# Patient Record
Sex: FEMALE | Race: WHITE | NOT HISPANIC OR LATINO | Employment: UNEMPLOYED | URBAN - METROPOLITAN AREA
[De-identification: names, ages, dates, MRNs, and addresses within clinical notes are randomized per-mention and may not be internally consistent; named-entity substitution may affect disease eponyms.]

---

## 2018-12-24 ENCOUNTER — APPOINTMENT (EMERGENCY)
Dept: ULTRASOUND IMAGING | Facility: HOSPITAL | Age: 23
End: 2018-12-24
Payer: COMMERCIAL

## 2018-12-24 ENCOUNTER — HOSPITAL ENCOUNTER (EMERGENCY)
Facility: HOSPITAL | Age: 23
Discharge: HOME | End: 2018-12-24
Attending: EMERGENCY MEDICINE | Admitting: EMERGENCY MEDICINE
Payer: COMMERCIAL

## 2018-12-24 VITALS
HEART RATE: 96 BPM | BODY MASS INDEX: 25.2 KG/M2 | RESPIRATION RATE: 18 BRPM | DIASTOLIC BLOOD PRESSURE: 58 MMHG | TEMPERATURE: 98.3 F | OXYGEN SATURATION: 99 % | SYSTOLIC BLOOD PRESSURE: 124 MMHG | WEIGHT: 180 LBS | HEIGHT: 71 IN

## 2018-12-24 DIAGNOSIS — O26.899 ABDOMINAL PAIN DURING PREGNANCY: Primary | ICD-10-CM

## 2018-12-24 DIAGNOSIS — R10.9 ABDOMINAL PAIN DURING PREGNANCY: Primary | ICD-10-CM

## 2018-12-24 LAB
ALBUMIN SERPL BCP-MCNC: 3.2 G/DL (ref 3.5–5)
ALP SERPL-CCNC: 61 U/L (ref 46–116)
ALT SERPL W P-5'-P-CCNC: 17 U/L (ref 12–78)
ANION GAP SERPL CALCULATED.3IONS-SCNC: 9 MMOL/L (ref 4–13)
AST SERPL W P-5'-P-CCNC: 10 U/L (ref 5–45)
BASOPHILS # BLD AUTO: 0.02 THOUSANDS/ΜL (ref 0–0.1)
BASOPHILS NFR BLD AUTO: 0 % (ref 0–1)
BILIRUB SERPL-MCNC: 0.3 MG/DL (ref 0.2–1)
BILIRUB UR QL STRIP: NEGATIVE
BUN SERPL-MCNC: 6 MG/DL (ref 5–25)
CALCIUM SERPL-MCNC: 9 MG/DL (ref 8.3–10.1)
CHLORIDE SERPL-SCNC: 102 MMOL/L (ref 100–108)
CLARITY UR: CLEAR
CO2 SERPL-SCNC: 26 MMOL/L (ref 21–32)
COLOR UR: YELLOW
CREAT SERPL-MCNC: 0.82 MG/DL (ref 0.6–1.3)
EOSINOPHIL # BLD AUTO: 0.28 THOUSAND/ΜL (ref 0–0.61)
EOSINOPHIL NFR BLD AUTO: 4 % (ref 0–6)
ERYTHROCYTE [DISTWIDTH] IN BLOOD BY AUTOMATED COUNT: 13.2 % (ref 11.6–15.1)
GFR SERPL CREATININE-BSD FRML MDRD: 102 ML/MIN/1.73SQ M
GLUCOSE SERPL-MCNC: 68 MG/DL (ref 65–140)
GLUCOSE UR STRIP-MCNC: NEGATIVE MG/DL
HCT VFR BLD AUTO: 38.2 % (ref 34.8–46.1)
HGB BLD-MCNC: 13 G/DL (ref 11.5–15.4)
HGB UR QL STRIP.AUTO: NEGATIVE
IMM GRANULOCYTES # BLD AUTO: 0.03 THOUSAND/UL (ref 0–0.2)
IMM GRANULOCYTES NFR BLD AUTO: 0 % (ref 0–2)
KETONES UR STRIP-MCNC: ABNORMAL MG/DL
LEUKOCYTE ESTERASE UR QL STRIP: NEGATIVE
LYMPHOCYTES # BLD AUTO: 1.47 THOUSANDS/ΜL (ref 0.6–4.47)
LYMPHOCYTES NFR BLD AUTO: 19 % (ref 14–44)
MCH RBC QN AUTO: 31.1 PG (ref 26.8–34.3)
MCHC RBC AUTO-ENTMCNC: 34 G/DL (ref 31.4–37.4)
MCV RBC AUTO: 91 FL (ref 82–98)
MONOCYTES # BLD AUTO: 0.53 THOUSAND/ΜL (ref 0.17–1.22)
MONOCYTES NFR BLD AUTO: 7 % (ref 4–12)
NEUTROPHILS # BLD AUTO: 5.32 THOUSANDS/ΜL (ref 1.85–7.62)
NEUTS SEG NFR BLD AUTO: 70 % (ref 43–75)
NITRITE UR QL STRIP: NEGATIVE
NRBC BLD AUTO-RTO: 0 /100 WBCS
PH UR STRIP.AUTO: 6.5 [PH] (ref 4.5–8)
PLATELET # BLD AUTO: 187 THOUSANDS/UL (ref 149–390)
PMV BLD AUTO: 9.9 FL (ref 8.9–12.7)
POTASSIUM SERPL-SCNC: 3.7 MMOL/L (ref 3.5–5.3)
PROT SERPL-MCNC: 6.9 G/DL (ref 6.4–8.2)
PROT UR STRIP-MCNC: NEGATIVE MG/DL
RBC # BLD AUTO: 4.18 MILLION/UL (ref 3.81–5.12)
SODIUM SERPL-SCNC: 137 MMOL/L (ref 136–145)
SP GR UR STRIP.AUTO: 1.02 (ref 1–1.03)
UROBILINOGEN UR QL STRIP.AUTO: 1 E.U./DL
WBC # BLD AUTO: 7.65 THOUSAND/UL (ref 4.31–10.16)

## 2018-12-24 PROCEDURE — 36415 COLL VENOUS BLD VENIPUNCTURE: CPT | Performed by: EMERGENCY MEDICINE

## 2018-12-24 PROCEDURE — 87086 URINE CULTURE/COLONY COUNT: CPT | Performed by: EMERGENCY MEDICINE

## 2018-12-24 PROCEDURE — 81003 URINALYSIS AUTO W/O SCOPE: CPT | Performed by: EMERGENCY MEDICINE

## 2018-12-24 PROCEDURE — 96360 HYDRATION IV INFUSION INIT: CPT

## 2018-12-24 PROCEDURE — 99284 EMERGENCY DEPT VISIT MOD MDM: CPT

## 2018-12-24 PROCEDURE — 80053 COMPREHEN METABOLIC PANEL: CPT | Performed by: EMERGENCY MEDICINE

## 2018-12-24 PROCEDURE — 76705 ECHO EXAM OF ABDOMEN: CPT

## 2018-12-24 PROCEDURE — 85025 COMPLETE CBC W/AUTO DIFF WBC: CPT | Performed by: EMERGENCY MEDICINE

## 2018-12-24 RX ORDER — ALBUTEROL SULFATE 90 UG/1
2 AEROSOL, METERED RESPIRATORY (INHALATION) EVERY 6 HOURS PRN
COMMUNITY

## 2018-12-24 RX ORDER — ACETAMINOPHEN 325 MG/1
650 TABLET ORAL ONCE
Status: COMPLETED | OUTPATIENT
Start: 2018-12-24 | End: 2018-12-24

## 2018-12-24 RX ADMIN — SODIUM CHLORIDE 1000 ML: 0.9 INJECTION, SOLUTION INTRAVENOUS at 17:41

## 2018-12-24 RX ADMIN — ACETAMINOPHEN 650 MG: 325 TABLET, FILM COATED ORAL at 17:42

## 2018-12-24 NOTE — DISCHARGE INSTRUCTIONS
Abdominal Pain in Pregnancy   WHAT YOU NEED TO KNOW:   Abdominal pain during pregnancy is common  Some of the causes include heartburn, constipation, gas, false labor, and round ligament pain  Round ligament pain is caused by stretching of the ligaments that support your uterus  Abdominal pain may be caused by a health problem, such as a stomach virus or appendicitis (inflammation of the appendix)  The pain may also be caused by a problem with your pregnancy, such as a threatened miscarriage or  labor  DISCHARGE INSTRUCTIONS:   Follow up with your obstetrician within 3 days:  Write down your questions so you remember to ask them during your visits  Self-care:   · Rest may help to relieve round ligament pain  Ask your healthcare provider about other ways to relieve this pain, such as a supportive belt or pregnancy exercises  · Use a heating pad set to the lowest setting or a hot compress to apply heat to your abdomen  Do this for 20 to 30 minutes every 2 hours for as many days as directed  · Avoid quick changes in position or movements that cause pain  · Do not lie flat in bed or bend over if you have heartburn  Ask your obstetrician if you should make any changes to the foods you eat  Ask if you can take any medicines for heartburn  · Eat more fiber and drink more liquids to relieve constipation  Fiber is found in fruits, vegetables, and whole-grain foods, such as whole-wheat bread and cereals  Ask how much liquid to drink each day and which liquids are best for you  Contact your obstetrician if:  · You continue to have abdominal pain that cannot be relieved  · You have a fever  · You have questions or concerns about your condition or care  Return to the emergency department if:   · You have sudden, severe pain or cramps that are so bad that you cannot walk or talk  · You have a fast heartbeat, shortness of breath, and you feel lightheaded or faint       · You have vaginal bleeding or discharge  · You have nausea, vomiting, fever, and severe pain on your right side  © 2017 [address] [employer] Information is for End User's use only and may not be sold, redistributed or otherwise used for commercial purposes  All illustrations and images included in CareNotes® are the copyrighted property of [employer]  or Adam [employer]  The above information is an  only  It is not intended as medical advice for individual conditions or treatments  Talk to your doctor, nurse or pharmacist before following any medical regimen to see if it is safe and effective for you

## 2018-12-24 NOTE — ED PROVIDER NOTES
History  Chief Complaint   Patient presents with    Abdominal Pain Pregnant     States RLQ aching pain that is very severe today, with intermittent stabbing pain  14 weeks pregnant, David Genao, denies vaginal bleeding      45-year-old female presents for evaluation of abdominal pain  Patient is  P2, approximately told 14 weeks pregnant (14 weeks pregnant by dates, 12 weeks 2 days by ultrasound) presents the emergency department for evaluation of right lower quadrant abdominal pain that started this morning  Patient states that it was a gradual onset, worsening throughout the day, constant pain however worse with movement, sharp pain  Patient states that it feels consistent with prior spontaneous abortions  Patient denies vaginal discharge, no vaginal bleeding, vaginal spotting  She has had an uncomplicated pregnancy otherwise however she does not have OB care at this time because she just moved to this area  She has had ultrasound to confirm IUP  Patient denies trauma to the abdomen  She denies dysuria  She denies nausea and vomiting  She denies diarrhea and constipation  No prior surgical abdominal history  Otherwise healthy 45-year-old female  Prior to Admission Medications   Prescriptions Last Dose Informant Patient Reported? Taking? albuterol (PROVENTIL HFA,VENTOLIN HFA) 90 mcg/act inhaler   Yes Yes   Sig: Inhale 2 puffs every 6 (six) hours as needed for wheezing      Facility-Administered Medications: None       Past Medical History:   Diagnosis Date    Anxiety     Asthma     Cancer ([address] )     skin cancer    Depression     Dissociative identity disorder ([address] )     Intermittent explosive disorder in adult     Migraine     PTSD (post-traumatic stress disorder)        Past Surgical History:   Procedure Laterality Date    ADENOIDECTOMY      CYST REMOVAL      nares    EAR FOREIGN BODY REMOVAL      SKIN CANCER EXCISION      TONSILLECTOMY         History reviewed   No pertinent family history  I have reviewed and agree with the history as documented  Social History   Substance Use Topics    Smoking status: Current Every Day Smoker     Packs/day: 0 50     Types: Cigarettes    Smokeless tobacco: Never Used    Alcohol use No        Review of Systems   Constitutional: Negative for chills, fatigue and fever  HENT: Negative for congestion and sore throat  Respiratory: Negative for apnea, cough, chest tightness and shortness of breath  Cardiovascular: Negative for chest pain and palpitations  Gastrointestinal: Positive for abdominal pain (RLQ - sharp)  Negative for constipation, diarrhea, nausea and vomiting  Genitourinary: Negative for dysuria, pelvic pain, vaginal bleeding (mild spotting), vaginal discharge and vaginal pain  Musculoskeletal: Negative for back pain and neck pain  Skin: Negative for color change and rash  Allergic/Immunologic: Negative for immunocompromised state  Neurological: Negative for dizziness, syncope and headaches  Hematological: Does not bruise/bleed easily  Physical Exam  Physical Exam   Constitutional: She is oriented to person, place, and time  She appears well-developed and well-nourished  No distress  HENT:   Head: Normocephalic and atraumatic  Mouth/Throat: Oropharynx is clear and moist    Eyes: Pupils are equal, round, and reactive to light  Conjunctivae and EOM are normal  Right eye exhibits no discharge  Left eye exhibits no discharge  No scleral icterus  Neck: Normal range of motion  Neck supple  No JVD present  Cardiovascular: Normal rate, regular rhythm, normal heart sounds and intact distal pulses  Exam reveals no gallop and no friction rub  No murmur heard  Pulmonary/Chest: Effort normal and breath sounds normal  No respiratory distress  She has no wheezes  She has no rales  She exhibits no tenderness  Abdominal: Soft  Bowel sounds are normal  She exhibits no distension  There is tenderness (RLQ)   There is guarding  There is no rebound  Gravid - TTP RLQ   Musculoskeletal: Normal range of motion  She exhibits no edema, tenderness or deformity  Neurological: She is alert and oriented to person, place, and time  No cranial nerve deficit  Skin: Skin is warm and dry  No rash noted  She is not diaphoretic  No erythema  No pallor  Psychiatric: She has a normal mood and affect  Her behavior is normal    Vitals reviewed        Vital Signs  ED Triage Vitals [12/24/18 1639]   Temperature Pulse Respirations Blood Pressure SpO2   98 3 °F (36 8 °C) 96 18 124/58 99 %      Temp Source Heart Rate Source Patient Position - Orthostatic VS BP Location FiO2 (%)   Oral Monitor Sitting Left arm --      Pain Score       6           Vitals:    12/24/18 1639   BP: 124/58   Pulse: 96   Patient Position - Orthostatic VS: Sitting       Visual Acuity      ED Medications  Medications   sodium chloride 0 9 % bolus 1,000 mL (0 mL Intravenous Stopped 12/24/18 1841)   acetaminophen (TYLENOL) tablet 650 mg (650 mg Oral Given 12/24/18 1742)       Diagnostic Studies  Results Reviewed     Procedure Component Value Units Date/Time    Urine culture [[id number]] Collected:  12/24/18 1718    Lab Status:  Final result Specimen:  Urine from Urine, Clean Catch Updated:  12/25/18 1812     Urine Culture [id number] cfu/ml     Comprehensive metabolic panel [[id number]]  (Abnormal) Collected:  12/24/18 1740    Lab Status:  Final result Specimen:  Blood from Arm, Right Updated:  12/24/18 1802     Sodium 137 mmol/L      Potassium 3 7 mmol/L      Chloride 102 mmol/L      CO2 26 mmol/L      ANION GAP 9 mmol/L      BUN 6 mg/dL      Creatinine 0 82 mg/dL      Glucose 68 mg/dL      Calcium 9 0 mg/dL      AST 10 U/L      ALT 17 U/L      Alkaline Phosphatase 61 U/L      Total Protein 6 9 g/dL      Albumin 3 2 (L) g/dL      Total Bilirubin 0 30 mg/dL      eGFR 102 ml/min/1 73sq m     Narrative:         National Kidney Disease Education Program recommendations are as follows:  GFR calculation is accurate only with a steady state creatinine  Chronic Kidney disease less than 60 ml/min/1 73 sq  meters  Kidney failure less than 15 ml/min/1 73 sq  meters  CBC and differential [[id number]] Collected:  12/24/18 1740    Lab Status:  Final result Specimen:  Blood from Arm, Right Updated:  12/24/18 1747     WBC 7 65 Thousand/uL      RBC 4 18 Million/uL      Hemoglobin 13 0 g/dL      Hematocrit 38 2 %      MCV 91 fL      MCH 31 1 pg      MCHC 34 0 g/dL      RDW 13 2 %      MPV 9 9 fL      Platelets 397 Thousands/uL      nRBC 0 /100 WBCs      Neutrophils Relative 70 %      Immat GRANS % 0 %      Lymphocytes Relative 19 %      Monocytes Relative 7 %      Eosinophils Relative 4 %      Basophils Relative 0 %      Neutrophils Absolute 5 32 Thousands/µL      Immature Grans Absolute 0 03 Thousand/uL      Lymphocytes Absolute 1 47 Thousands/µL      Monocytes Absolute 0 53 Thousand/µL      Eosinophils Absolute 0 28 Thousand/µL      Basophils Absolute 0 02 Thousands/µL     UA w Reflex to Microscopic w Reflex to Culture [[id number]]  (Abnormal) Collected:  12/24/18 1718    Lab Status:  Final result Specimen:  Urine from Urine, Clean Catch Updated:  12/24/18 1727     Color, UA Yellow     Clarity, UA Clear     Specific [place], UA 1 020     pH, UA 6 5     Leukocytes, UA Negative     Nitrite, UA Negative     Protein, UA Negative mg/dl      Glucose, UA Negative mg/dl      Ketones, UA 15 (1+) (A) mg/dl      Urobilinogen, UA 1 0 E U /dl      Bilirubin, UA Negative     Blood, UA Negative     URINE COMMENT --                 US appendix   ED Interpretation by Judy Anne DO (12/24 1825)   Although appendix is not identified, there are no sonographic findings to suggest acute appendicitis  Final Result by Neetu Tiwari MD (12/24 1815)      Although appendix is not identified, there are no sonographic findings to suggest acute appendicitis              Workstation performed: [id number] Procedures  Procedures       Phone Contacts  ED Phone Contact    ED Course  ED Course as of Dec 26 1146   Mon Dec 24, 2018   1716 Approximately 14 weeks pregnant  States 1st day of last menstrual period was mid September possibly  No OB in the area  MDM  Number of Diagnoses or Management Options  Abdominal pain during pregnancy:   Diagnosis management comments: Abdominal pain in pregnancy  Unable to obtain CT scan secondary to pregnancy  Risks and benefits were discussed with patient and she agrees not have CT scan performed  Bedside ultrasound indicates a 12 week 4 day fetus, reassuring fetal heart rate, 150 beats per minute  Normal fetal movement  Abdominal lab work is performed and within normal limits  Urinalysis does not indicate urinary tract infection or blood in urine  A formal right lower quadrant abdominal ultrasound is performed, no evidence of pathology regarding abdominal pain  Normal appendix, no abnormalities seen with placenta of IUP  Right ovary is normal and with normal blood flow  Likely pain associated with round ligament pain/growing pains of pregnancy  Patient has good return precautions, provide in an OB physician to follow up with, and advised return to the emergency department with worsening condition  Advised APAP for pain in pregnancy         Amount and/or Complexity of Data Reviewed  Clinical lab tests: ordered and reviewed  Tests in the radiology section of CPT®: ordered and reviewed      CritCare Time    Disposition  Final diagnoses:   Abdominal pain during pregnancy     Time reflects when diagnosis was documented in both MDM as applicable and the Disposition within this note     Time User Action Codes Description Comment    12/24/2018  6:27 PM Dariel Samaniego Add [O26 [id number],  R10 9] Abdominal pain during pregnancy       ED Disposition     ED Disposition Condition Comment    Discharge  [facility] discharge to home/self care     Condition at discharge: Good        Follow-up Information     Follow up With Specialties Details Why Contact Info Additional [facility] Emergency Department Emergency Medicine  If symptoms worsen:  Worsening abdominal pain, fever, chills, vaginal discharge, new concerning symptoms  [address]  [phone] [place] [facility], [place], [place], [address] [place] [address] Vicente Woodruff MD Obstetrics and Gynecology, Obstetrics, Gynecology Schedule an appointment as soon as possible for a visit For follow up for [facility] care, and for further testing and treatment for pregnancy 2 [facility] [address] [place] [address]  [phone]             Discharge Medication List as of 12/24/2018  6:28 PM      CONTINUE these medications which have NOT CHANGED    Details   albuterol (PROVENTIL HFA,VENTOLIN HFA) 90 mcg/act inhaler Inhale 2 puffs every 6 (six) hours as needed for wheezing, Historical Med           No discharge procedures on file      ED Provider  Electronically Signed by           Riddhi Quinn DO  12/26/18 8902

## 2018-12-25 LAB — BACTERIA UR CULT: NORMAL

## 2018-12-28 ENCOUNTER — APPOINTMENT (EMERGENCY)
Dept: CT IMAGING | Facility: HOSPITAL | Age: 23
End: 2018-12-28
Payer: COMMERCIAL

## 2018-12-28 ENCOUNTER — HOSPITAL ENCOUNTER (EMERGENCY)
Facility: HOSPITAL | Age: 23
Discharge: HOME | End: 2018-12-28
Attending: EMERGENCY MEDICINE | Admitting: EMERGENCY MEDICINE
Payer: COMMERCIAL

## 2018-12-28 VITALS
DIASTOLIC BLOOD PRESSURE: 50 MMHG | WEIGHT: 179.9 LBS | RESPIRATION RATE: 18 BRPM | SYSTOLIC BLOOD PRESSURE: 89 MMHG | TEMPERATURE: 97.9 F | BODY MASS INDEX: 25.09 KG/M2 | HEART RATE: 78 BPM | OXYGEN SATURATION: 99 %

## 2018-12-28 DIAGNOSIS — R51.9 FACIAL PAIN: ICD-10-CM

## 2018-12-28 DIAGNOSIS — R51.9 HEADACHE: Primary | ICD-10-CM

## 2018-12-28 LAB
ANION GAP SERPL CALCULATED.3IONS-SCNC: 10 MMOL/L (ref 4–13)
APTT PPP: 33 SECONDS (ref 26–38)
B-HCG SERPL-ACNC: ABNORMAL MIU/ML
BASOPHILS # BLD AUTO: 0.02 THOUSANDS/ΜL (ref 0–0.1)
BASOPHILS NFR BLD AUTO: 0 % (ref 0–1)
BUN SERPL-MCNC: 4 MG/DL (ref 5–25)
CALCIUM SERPL-MCNC: 8.9 MG/DL (ref 8.3–10.1)
CHLORIDE SERPL-SCNC: 103 MMOL/L (ref 100–108)
CO2 SERPL-SCNC: 26 MMOL/L (ref 21–32)
CREAT SERPL-MCNC: 0.76 MG/DL (ref 0.6–1.3)
EOSINOPHIL # BLD AUTO: 0.19 THOUSAND/ΜL (ref 0–0.61)
EOSINOPHIL NFR BLD AUTO: 2 % (ref 0–6)
ERYTHROCYTE [DISTWIDTH] IN BLOOD BY AUTOMATED COUNT: 13.3 % (ref 11.6–15.1)
GFR SERPL CREATININE-BSD FRML MDRD: 112 ML/MIN/1.73SQ M
GLUCOSE SERPL-MCNC: 106 MG/DL (ref 65–140)
HCT VFR BLD AUTO: 37.2 % (ref 34.8–46.1)
HGB BLD-MCNC: 12.5 G/DL (ref 11.5–15.4)
IMM GRANULOCYTES # BLD AUTO: 0.04 THOUSAND/UL (ref 0–0.2)
IMM GRANULOCYTES NFR BLD AUTO: 0 % (ref 0–2)
INR PPP: 1.08 (ref 0.86–1.17)
LYMPHOCYTES # BLD AUTO: 2.46 THOUSANDS/ΜL (ref 0.6–4.47)
LYMPHOCYTES NFR BLD AUTO: 23 % (ref 14–44)
MCH RBC QN AUTO: 30.9 PG (ref 26.8–34.3)
MCHC RBC AUTO-ENTMCNC: 33.6 G/DL (ref 31.4–37.4)
MCV RBC AUTO: 92 FL (ref 82–98)
MONOCYTES # BLD AUTO: 0.48 THOUSAND/ΜL (ref 0.17–1.22)
MONOCYTES NFR BLD AUTO: 4 % (ref 4–12)
NEUTROPHILS # BLD AUTO: 7.72 THOUSANDS/ΜL (ref 1.85–7.62)
NEUTS SEG NFR BLD AUTO: 71 % (ref 43–75)
NRBC BLD AUTO-RTO: 0 /100 WBCS
PLATELET # BLD AUTO: 211 THOUSANDS/UL (ref 149–390)
PMV BLD AUTO: 10 FL (ref 8.9–12.7)
POTASSIUM SERPL-SCNC: 3.5 MMOL/L (ref 3.5–5.3)
PROTHROMBIN TIME: 13.9 SECONDS (ref 11.8–14.2)
RBC # BLD AUTO: 4.04 MILLION/UL (ref 3.81–5.12)
SODIUM SERPL-SCNC: 139 MMOL/L (ref 136–145)
WBC # BLD AUTO: 10.91 THOUSAND/UL (ref 4.31–10.16)

## 2018-12-28 PROCEDURE — 80048 BASIC METABOLIC PNL TOTAL CA: CPT | Performed by: PHYSICIAN ASSISTANT

## 2018-12-28 PROCEDURE — 36415 COLL VENOUS BLD VENIPUNCTURE: CPT | Performed by: PHYSICIAN ASSISTANT

## 2018-12-28 PROCEDURE — 84702 CHORIONIC GONADOTROPIN TEST: CPT | Performed by: PHYSICIAN ASSISTANT

## 2018-12-28 PROCEDURE — 96361 HYDRATE IV INFUSION ADD-ON: CPT

## 2018-12-28 PROCEDURE — 99284 EMERGENCY DEPT VISIT MOD MDM: CPT

## 2018-12-28 PROCEDURE — 85730 THROMBOPLASTIN TIME PARTIAL: CPT | Performed by: PHYSICIAN ASSISTANT

## 2018-12-28 PROCEDURE — 85025 COMPLETE CBC W/AUTO DIFF WBC: CPT | Performed by: PHYSICIAN ASSISTANT

## 2018-12-28 PROCEDURE — 96374 THER/PROPH/DIAG INJ IV PUSH: CPT

## 2018-12-28 PROCEDURE — 85610 PROTHROMBIN TIME: CPT | Performed by: PHYSICIAN ASSISTANT

## 2018-12-28 PROCEDURE — 96375 TX/PRO/DX INJ NEW DRUG ADDON: CPT

## 2018-12-28 RX ORDER — DIPHENHYDRAMINE HYDROCHLORIDE 50 MG/ML
25 INJECTION INTRAMUSCULAR; INTRAVENOUS ONCE
Status: COMPLETED | OUTPATIENT
Start: 2018-12-28 | End: 2018-12-28

## 2018-12-28 RX ORDER — OXYMETAZOLINE HYDROCHLORIDE 0.05 G/100ML
2 SPRAY NASAL ONCE
Status: COMPLETED | OUTPATIENT
Start: 2018-12-28 | End: 2018-12-28

## 2018-12-28 RX ORDER — MAGNESIUM SULFATE HEPTAHYDRATE 40 MG/ML
2 INJECTION, SOLUTION INTRAVENOUS ONCE
Status: DISCONTINUED | OUTPATIENT
Start: 2018-12-28 | End: 2018-12-28

## 2018-12-28 RX ORDER — METOCLOPRAMIDE HYDROCHLORIDE 5 MG/ML
10 INJECTION INTRAMUSCULAR; INTRAVENOUS ONCE
Status: COMPLETED | OUTPATIENT
Start: 2018-12-28 | End: 2018-12-28

## 2018-12-28 RX ADMIN — METOCLOPRAMIDE 10 MG: 5 INJECTION, SOLUTION INTRAMUSCULAR; INTRAVENOUS at 05:57

## 2018-12-28 RX ADMIN — OXYMETAZOLINE HYDROCHLORIDE 2 SPRAY: 0.05 SPRAY NASAL at 07:21

## 2018-12-28 RX ADMIN — SODIUM CHLORIDE 1000 ML: 0.9 INJECTION, SOLUTION INTRAVENOUS at 05:50

## 2018-12-28 RX ADMIN — DIPHENHYDRAMINE HYDROCHLORIDE 25 MG: 50 INJECTION INTRAMUSCULAR; INTRAVENOUS at 05:56

## 2018-12-28 NOTE — ED NOTES
Pt resting comfortably, pt needs to wait for fluids to finish then she can be discharged        Ryan Johnson RN  12/28/18 0998

## 2018-12-28 NOTE — DISCHARGE INSTRUCTIONS
Take Afrin as indicated  Follow-up with Neurology  Follow-up with PCP  Follow up with emergency department symptoms persist or exacerbate    Acute Headache   WHAT YOU NEED TO KNOW:   An acute headache is pain or discomfort that starts suddenly and gets worse quickly  You may have an acute headache only when you feel stress or eat certain foods  Other acute headache pain can happen every day, and sometimes several times a day  DISCHARGE INSTRUCTIONS:   Seek care immediately if:   · You have severe pain  · You have numbness or weakness on one side of your face or body  · You have a headache that occurs after a blow to the head, a fall, or other trauma  · You have a headache, are forgetful or confused, or have trouble speaking  · You have a headache, stiff neck, and a fever  Contact your healthcare provider if:   · You have a constant headache and are vomiting  · You have a headache each day that does not get better, even after treatment  · You have changes in your headaches, or new symptoms that occur when you have a headache  · You have questions or concerns about your condition or care  Medicines: You may need any of the following:  · Prescription pain medicine  may be given  The medicine your healthcare provider recommends will depend on the kind of headaches you have  You will need to take prescription headache medicines as directed to prevent a problem called rebound headache  These headaches happen with regular use of pain relievers for headache disorders  · NSAIDs , such as ibuprofen, help decrease swelling, pain, and fever  This medicine is available with or without a doctor's order  NSAIDs can cause stomach bleeding or kidney problems in certain people  If you take blood thinner medicine, always ask your healthcare provider if NSAIDs are safe for you  Always read the medicine label and follow directions  · Acetaminophen  decreases pain and fever   It is available without a doctor's order  Ask how much to take and how often to take it  Follow directions  Read the labels of all other medicines you are using to see if they also contain acetaminophen, or ask your doctor or pharmacist  Acetaminophen can cause liver damage if not taken correctly  Do not use more than 3 grams (3,000 milligrams) total of acetaminophen in one day  · Antidepressants  may be given for some kinds of headaches  · Take your medicine as directed  Contact your healthcare provider if you think your medicine is not helping or if you have side effects  Tell him or her if you are allergic to any medicine  Keep a list of the medicines, vitamins, and herbs you take  Include the amounts, and when and why you take them  Bring the list or the pill bottles to follow-up visits  Carry your medicine list with you in case of an emergency  Manage your symptoms:   · Apply heat or ice  on the headache area  Use a heat or ice pack  For an ice pack, you can also put crushed ice in a plastic bag  Cover the pack or bag with a towel before you apply it to your skin  Ice and heat both help decrease pain, and heat also helps decrease muscle spasms  Apply heat for 20 to 30 minutes every 2 hours  Apply ice for 15 to 20 minutes every hour  Apply heat or ice for as long and for as many days as directed  You may alternate heat and ice  · Relax your muscles  Lie down in a comfortable position and close your eyes  Relax your muscles slowly  Start at your toes and work your way up your body  · Keep a record of your headaches  Write down when your headaches start and stop  Include your symptoms and what you were doing when the headache began  Record what you ate or drank for 24 hours before the headache started  Describe the pain and where it hurts  Keep track of what you did to treat your headache and if it worked  Prevent an acute headache:   · Avoid anything that triggers an acute headache    Examples include exposure to chemicals, going to high altitude, or not getting enough sleep  Create a regular sleep routine  Go to sleep at the same time and wake up at the same time each day  Do not use electronic devices before bedtime  These may trigger a headache or prevent you from sleeping well  · Do not smoke  Nicotine and other chemicals in cigarettes and cigars can trigger an acute headache or make it worse  Ask your healthcare provider for information if you currently smoke and need help to quit  E-cigarettes or smokeless tobacco still contain nicotine  Talk to your healthcare provider before you use these products  · Limit alcohol as directed  Alcohol can trigger an acute headache or make it worse  If you have cluster headaches, do not drink alcohol during an episode  For other types of headaches, ask your healthcare provider if it is safe for you to drink alcohol  Ask how much is safe for you to drink, and how often  · Exercise as directed  Exercise can reduce tension and help with headache pain  Aim for 30 minutes of physical activity on most days of the week  Your healthcare provider can help you create an exercise plan  · Eat a variety of healthy foods  Healthy foods include fruits, vegetables, low-fat dairy products, lean meats, fish, whole grains, and cooked beans  Your healthcare provider or dietitian can help you create meals plans if you need to avoid foods that trigger headaches  Follow up with your healthcare provider as directed:  Bring your headache record with you when you see your healthcare provider  Write down your questions so you remember to ask them during your visits  © 2017 [address] Information is for End User's use only and may not be sold, redistributed or otherwise used for commercial purposes  All illustrations and images included in CareNotes® are the copyrighted property of [employer]  or [employer]  The above information is an  only   It is not intended as medical advice for individual conditions or treatments  Talk to your doctor, nurse or pharmacist before following any medical regimen to see if it is safe and effective for you  Acute Headache   WHAT YOU NEED TO KNOW:   An acute headache is pain or discomfort that starts suddenly and gets worse quickly  You may have an acute headache only when you feel stress or eat certain foods  Other acute headache pain can happen every day, and sometimes several times a day  DISCHARGE INSTRUCTIONS:   Return to the emergency department if:   · You have severe pain  · You have numbness or weakness on one side of your face or body  · You have a headache that occurs after a blow to the head, a fall, or other trauma  · You have a headache, are forgetful or confused, or have trouble speaking  · You have a headache, stiff neck, and a fever  Contact your healthcare provider if:   · You have a constant headache and are vomiting  · You have a headache each day that does not get better, even after treatment  · You have changes in your headaches, or new symptoms that occur when you have a headache  · You have questions or concerns about your condition or care  Medicines: You may need any of the following:  · Prescription pain medicine  may be given  The medicine your healthcare provider recommends will depend on the kind of headaches you have  You will need to take prescription headache medicines as directed to prevent a problem called rebound headache  These headaches happen with regular use of pain relievers for headache disorders  · NSAIDs , such as ibuprofen, help decrease swelling, pain, and fever  This medicine is available with or without a doctor's order  NSAIDs can cause stomach bleeding or kidney problems in certain people  If you take blood thinner medicine, always ask your healthcare provider if NSAIDs are safe for you   Always read the medicine label and follow directions  · Acetaminophen  decreases pain and fever  It is available without a doctor's order  Ask how much to take and how often to take it  Follow directions  Read the labels of all other medicines you are using to see if they also contain acetaminophen, or ask your doctor or pharmacist  Acetaminophen can cause liver damage if not taken correctly  Do not use more than 3 grams (3,000 milligrams) total of acetaminophen in one day  · Antidepressants  may be given for some kinds of headaches  · Take your medicine as directed  Contact your healthcare provider if you think your medicine is not helping or if you have side effects  Tell him or her if you are allergic to any medicine  Keep a list of the medicines, vitamins, and herbs you take  Include the amounts, and when and why you take them  Bring the list or the pill bottles to follow-up visits  Carry your medicine list with you in case of an emergency  Manage your symptoms:   · Apply heat or ice  on the headache area  Use a heat or ice pack  For an ice pack, you can also put crushed ice in a plastic bag  Cover the pack or bag with a towel before you apply it to your skin  Ice and heat both help decrease pain, and heat also helps decrease muscle spasms  Apply heat for 20 to 30 minutes every 2 hours  Apply ice for 15 to 20 minutes every hour  Apply heat or ice for as long and for as many days as directed  You may alternate heat and ice  · Relax your muscles  Lie down in a comfortable position and close your eyes  Relax your muscles slowly  Start at your toes and work your way up your body  · Keep a record of your headaches  Write down when your headaches start and stop  Include your symptoms and what you were doing when the headache began  Record what you ate or drank for 24 hours before the headache started  Describe the pain and where it hurts  Keep track of what you did to treat your headache and if it worked    Prevent an acute headache: · Avoid anything that triggers an acute headache  Examples include exposure to chemicals, going to high altitude, or not getting enough sleep  Create a regular sleep routine  Go to sleep at the same time and wake up at the same time each day  Do not use electronic devices before bedtime  These may trigger a headache or prevent you from sleeping well  · Do not smoke  Nicotine and other chemicals in cigarettes and cigars can trigger an acute headache or make it worse  Ask your healthcare provider for information if you currently smoke and need help to quit  E-cigarettes or smokeless tobacco still contain nicotine  Talk to your healthcare provider before you use these products  · Limit alcohol as directed  Alcohol can trigger an acute headache or make it worse  If you have cluster headaches, do not drink alcohol during an episode  For other types of headaches, ask your healthcare provider if it is safe for you to drink alcohol  Ask how much is safe for you to drink, and how often  · Exercise as directed  Exercise can reduce tension and help with headache pain  Aim for 30 minutes of physical activity on most days of the week  Your healthcare provider can help you create an exercise plan  · Eat a variety of healthy foods  Healthy foods include fruits, vegetables, low-fat dairy products, lean meats, fish, whole grains, and cooked beans  Your healthcare provider or dietitian can help you create meals plans if you need to avoid foods that trigger headaches  Follow up with your healthcare provider as directed:  Bring your headache record with you when you see your healthcare provider  Write down your questions so you remember to ask them during your visits  © 2017 [address] [employer] Information is for End User's use only and may not be sold, redistributed or otherwise used for commercial purposes   All illustrations and images included in CareNotes® are the copyrighted property of [employer]  or [employer] Rosa  The above information is an  only  It is not intended as medical advice for individual conditions or treatments  Talk to your doctor, nurse or pharmacist before following any medical regimen to see if it is safe and effective for you  Oxymetazoline (Into the nose)   Oxymetazoline ([id number])  Helps relieve a stuffy nose  Brand Name(s): 12-Hour Nasal, 4-Way Long Lasting, Afrin, Afrin Extra Moisturizing, Afrin No-Drip Sinus, Afrin PumpMist, Afrin Sinus, Afrin w/Menthol, Dristan 12-Hr, Duramist Plus, [facility] Nasal [place] Pump, [facility] Nasal [place], Good Sense Nasal Spray, Mucinex Full Force, Mucinex Moisture Smart   There may be other brand names for this medicine  When This Medicine Should Not Be Used: This medicine is not right for everyone  Do not use it if you had an allergic reaction to oxymetazoline  How to Use This Medicine:   Spray  · Your doctor will tell you how much medicine to use  Do not use more than directed  · Follow the instructions on the medicine label if you are using this medicine without a prescription  · If you are using the nasal spray for the first time, you will need to prime the spray  To do this, pump or squeeze the bottle until some of the medicine sprays out  Now it is ready to use  Prime the spray after each time you clean the pump, or if you have not used the medicine for 5 days or longer  · Shake the nasal spray well before each use  · Before using the medicine, gently blow your nose to clear the nostrils  · Keep your head upright while spraying the medicine into each nostril  Repeat until you have used 2 to 3 sprays in each nostril  · Do not use more than two times in 24 hours, unless your doctor tells you to  · Do not share this medicine with other people  · After using the nasal spray, wipe the tip of the bottle with a clean tissue and put the cap back on   · Missed dose:  Take a dose as soon as you remember  If it is almost time for your next dose, wait until then and take a regular dose  Do not take extra medicine to make up for a missed dose  · Keep the bottle tightly closed when not using it  Store at room temperature, away from heat and direct light  Drugs and Foods to Avoid:      Ask your doctor or pharmacist before using any other medicine, including over-the-counter medicines, vitamins, and herbal products  Warnings While Using This Medicine:   · Tell your doctor if you are pregnant or breastfeeding, or if you have heart disease, high blood pressure, diabetes, thyroid problems, or an enlarged prostate  · Do not use this medicine for more than 3 days unless your doctor tell you to  · Call your doctor if your symptoms do not improve or if they get worse  · Keep all medicine out of the reach of children  Never share your medicine with anyone  Possible Side Effects While Using This Medicine:   Call your doctor right away if you notice any of these side effects:  · Allergic reaction: Itching or hives, swelling in your face or hands, swelling or tingling in your mouth or throat, chest tightness, trouble breathing  If you notice these less serious side effects, talk with your doctor:   · Burning or stinging inside of your nose  If you notice other side effects that you think are caused by this medicine, tell your doctor  Call your doctor for medical advice about side effects  You may report side effects to FDA at 9-769-FDA-1317  © 2017 [address] Information is for End User's use only and may not be sold, redistributed or otherwise used for commercial purposes  The above information is an  only  It is not intended as medical advice for individual conditions or treatments  Talk to your doctor, nurse or pharmacist before following any medical regimen to see if it is safe and effective for you        Oxymetazoline (Into the nose)   Oxymetazoline ([id number])  Helps relieve a stuffy nose  Brand Name(s): 12-Hour Nasal, 4-Way Long Lasting, Afrin, Afrin Extra Moisturizing, Afrin No-Drip Sinus, Afrin PumpMist, Afrin Sinus, Afrin w/Menthol, Dristan 12-Hr, Duramist Plus, [facility] Nasal [place] Pump, [facility] Nasal [place], Good Sense Nasal Spray, Mucinex Full Force, Mucinex Moisture Smart   There may be other brand names for this medicine  When This Medicine Should Not Be Used: This medicine is not right for everyone  Do not use it if you had an allergic reaction to oxymetazoline  How to Use This Medicine:   Spray  · Your doctor will tell you how much medicine to use  Do not use more than directed  · Follow the instructions on the medicine label if you are using this medicine without a prescription  · If you are using the nasal spray for the first time, you will need to prime the spray  To do this, pump or squeeze the bottle until some of the medicine sprays out  Now it is ready to use  Prime the spray after each time you clean the pump, or if you have not used the medicine for 5 days or longer  · Shake the nasal spray well before each use  · Before using the medicine, gently blow your nose to clear the nostrils  · Keep your head upright while spraying the medicine into each nostril  Repeat until you have used 2 to 3 sprays in each nostril  · Do not use more than two times in 24 hours, unless your doctor tells you to  · Do not share this medicine with other people  · After using the nasal spray, wipe the tip of the bottle with a clean tissue and put the cap back on   · Missed dose: Take a dose as soon as you remember  If it is almost time for your next dose, wait until then and take a regular dose  Do not take extra medicine to make up for a missed dose  · Keep the bottle tightly closed when not using it  Store at room temperature, away from heat and direct light    Drugs and Foods to Avoid:      Ask your doctor or pharmacist before using any other medicine, including over-the-counter medicines, vitamins, and herbal products  Warnings While Using This Medicine:   · Tell your doctor if you are pregnant or breastfeeding, or if you have heart disease, high blood pressure, diabetes, thyroid problems, or an enlarged prostate  · Do not use this medicine for more than 3 days unless your doctor tell you to  · Call your doctor if your symptoms do not improve or if they get worse  · Keep all medicine out of the reach of children  Never share your medicine with anyone  Possible Side Effects While Using This Medicine:   Call your doctor right away if you notice any of these side effects:  · Allergic reaction: Itching or hives, swelling in your face or hands, swelling or tingling in your mouth or throat, chest tightness, trouble breathing  If you notice these less serious side effects, talk with your doctor:   · Burning or stinging inside of your nose  If you notice other side effects that you think are caused by this medicine, tell your doctor  Call your doctor for medical advice about side effects  You may report side effects to FDA at 3-019-FDA-2177  © 2017 [address] [employer] Information is for End User's use only and may not be sold, redistributed or otherwise used for commercial purposes  The above information is an  only  It is not intended as medical advice for individual conditions or treatments  Talk to your doctor, nurse or pharmacist before following any medical regimen to see if it is safe and effective for you

## 2018-12-28 NOTE — ED PROVIDER NOTES
History  Chief Complaint   Patient presents with    Migraine     Pt presents to ED with severe migraine  Pt states she has chronic migraines but this is "much worse"     Patient is a 21-year-old female with history of asthma, migraine, anxiety that presents emergency department with severe searing nonradiating headache for 2 days  Patient states that her present headache began 2 days ago and was similar to migraines she had in the past   Patient stated that her headache gradually increased in severity still a walker out of a sleep this evening, and she characterizes her present headache as the worst in her life  Patient also has associated symptoms of frontal and maxillary bilateral congestion, and intermittent hacking productive cough with yellow sputum for 2 days  Patient also admits that she is 14 weeks pregnant  Patient's significant other is at bedside at the time of presentation  Patient also verbalizes that she could not take Motrin for headache symptom abatement due to her current pregnancy, and that this was the 1st headache she has had during pregnancy  Patient affirms provocative factors of lights and sounds, and denies palliative factors  Patient denies ineffectual treatment  Patient denies fever, chills, diarrhea, constipation, and urinary symptoms  Patient affirms blurry vision photophobia and audiophobia  Patient denies meningeal and vertiginous symptomatology  Patient denies tinnitus  Patient denies recent fall  Patient affirms having been hit in the frontal aspect of her skull by a "slow opening car door four days ago", and had not reported any loss of consciousness, vomiting, blurred vision, or tinnitus status post incident and to current presentation  Patient denies numbness, tingling, and loss of power  Patient denies family history of stroke and cerebral aneurysm  Patient denies chest pain, shortness of breath, and abdominal pain  Patient is not in acute distress      Patient requested lytes to be turned off in ED 29 during physical exam   Patient had been closing her eyes lying on her left side and rocking back and forth on stretcher  Patient has benign neurological exam with photophobia identified with PERRL b/l  Patient had ambulated into the emergency department without difficulty without assistance of a cane or ambulatory device  Head CT scan had been offered to patient, and patient declined imaging procedure after risks of radiation to her unborn fetus had been presented by [facility] and CT tech both verbally and for the use of risk of radiation foreign issue by head CT scan prior to head CT study  Patient was given 20 min to make a decision to have head CT performed, and patient verbally declined having had head CT scan performed  Will deliver Reglan, Benadryl, and normal saline solution for patient headache  Will also deliver Afrin for bilateral sinus congestion  Will continue to reassess patient headache symptomatology frequently throughout ED visit  Will also attain clinical blood labs  Differential Diagnosis consists of but not limited to:  Epidural hematoma  Subdural hematoma  Subarachnoid hemorrhage  Concussion  Migraine headache  Tension headache  Sinusitis         History provided by:  Patient and friend   used: No        Prior to Admission Medications   Prescriptions Last Dose Informant Patient Reported? Taking?    albuterol (PROVENTIL HFA,VENTOLIN HFA) 90 mcg/act inhaler   Yes No   Sig: Inhale 2 puffs every 6 (six) hours as needed for wheezing      Facility-Administered Medications: None       Past Medical History:   Diagnosis Date    Anxiety     Asthma     Cancer ([facility] [address] )     skin cancer    Depression     Dissociative identity disorder ([facility] [address] )     Intermittent explosive disorder in adult     Migraine     PTSD (post-traumatic stress disorder)        Past Surgical History:   Procedure Laterality Date    ADENOIDECTOMY      CYST REMOVAL nares    EAR FOREIGN BODY REMOVAL      SKIN CANCER EXCISION      TONSILLECTOMY         History reviewed  No pertinent family history  I have reviewed and agree with the history as documented  Social History   Substance Use Topics    Smoking status: Current Every Day Smoker     Packs/day: 0 50     Types: Cigarettes    Smokeless tobacco: Never Used    Alcohol use No        Review of Systems   Constitutional: Negative for activity change, appetite change, chills and fever  HENT: Positive for congestion, sinus pain and sinus pressure  Eyes: Positive for photophobia and visual disturbance  Negative for pain and redness  Respiratory: Negative for cough, chest tightness, shortness of breath, wheezing and stridor  Cardiovascular: Negative for chest pain and palpitations  Gastrointestinal: Negative for abdominal pain, constipation, diarrhea, nausea and vomiting  Genitourinary: Negative for difficulty urinating, dysuria and frequency  Musculoskeletal: Negative for back pain and gait problem  Skin: Negative for pallor and rash  Allergic/Immunologic: Negative for environmental allergies and food allergies  Neurological: Negative for dizziness, speech difficulty, weakness, light-headedness, numbness and headaches  Psychiatric/Behavioral: Negative for confusion  All other systems reviewed and are negative  Physical Exam  Physical Exam   Constitutional: She is oriented to person, place, and time  She appears well-developed and well-nourished  She is cooperative  HENT:   Head: Normocephalic and atraumatic  Right Ear: Hearing, tympanic membrane, external ear and ear canal normal  No drainage, swelling or tenderness  No mastoid tenderness  No decreased hearing is noted  Left Ear: Hearing, tympanic membrane, external ear and ear canal normal  No drainage, swelling or tenderness  No mastoid tenderness  No decreased hearing is noted  Nose: Mucosal edema present   Right sinus exhibits maxillary sinus tenderness and frontal sinus tenderness  Left sinus exhibits maxillary sinus tenderness and frontal sinus tenderness  Mouth/Throat: Uvula is midline, oropharynx is clear and moist and mucous membranes are normal  No oropharyngeal exudate, posterior oropharyngeal edema or posterior oropharyngeal erythema  No tonsillar exudate  Eyes: Pupils are equal, round, and reactive to light  Conjunctivae, EOM and lids are normal  Right eye exhibits no discharge  Left eye exhibits no discharge  Neck: Trachea normal, normal range of motion, full passive range of motion without pain and phonation normal  Neck supple  No JVD present  No tracheal tenderness, no spinous process tenderness and no muscular tenderness present  No neck rigidity  No tracheal deviation and normal range of motion present  Cardiovascular: Normal rate, regular rhythm, normal heart sounds, intact distal pulses and normal pulses  Pulses:       Carotid pulses are 2+ on the right side, and 2+ on the left side  Radial pulses are 2+ on the right side, and 2+ on the left side  Dorsalis pedis pulses are 2+ on the right side, and 2+ on the left side  Posterior tibial pulses are 2+ on the right side, and 2+ on the left side  Pulmonary/Chest: Effort normal and breath sounds normal  No stridor  She has no decreased breath sounds  She has no wheezes  She has no rhonchi  She has no rales  She exhibits no tenderness and no crepitus  Abdominal: Soft  Normal appearance and bowel sounds are normal  She exhibits no distension  There is no tenderness  There is no rigidity, no rebound, no guarding and no CVA tenderness  Musculoskeletal: Normal range of motion     Passive ROM intact  Upper and lower extremity 5/5 bilaterally  Neurovascularly intact  No grinding or clicking of joints     Lymphadenopathy:        Head (right side): No submental, no submandibular, no tonsillar, no preauricular, no posterior auricular and no occipital adenopathy present  Head (left side): No submental, no submandibular, no tonsillar, no preauricular, no posterior auricular and no occipital adenopathy present  She has no cervical adenopathy  Right cervical: No superficial cervical, no deep cervical and no posterior cervical adenopathy present  Left cervical: No superficial cervical, no deep cervical and no posterior cervical adenopathy present  Neurological: She is alert and oriented to person, place, and time  She has normal strength and normal reflexes  She displays no tremor  No cranial nerve deficit (cn 2-12 intact) or sensory deficit  She displays a negative Romberg sign  Coordination and gait normal  GCS eye subscore is 4  GCS verbal subscore is 5  GCS motor subscore is 6  Reflex Scores:       Patellar reflexes are 2+ on the right side and 2+ on the left side  Skin: Skin is warm and intact  Capillary refill takes less than 2 seconds  She is not diaphoretic  Psychiatric: She has a normal mood and affect  Her speech is normal and behavior is normal  Judgment and thought content normal  Cognition and memory are normal    Nursing note and vitals reviewed        Vital Signs  ED Triage Vitals   Temperature Pulse Respirations Blood Pressure SpO2   12/28/18 0437 12/28/18 0437 12/28/18 0437 12/28/18 0437 12/28/18 0437   97 9 °F (36 6 °C) 93 18 119/77 98 %      Temp Source Heart Rate Source Patient Position - Orthostatic VS BP Location FiO2 (%)   12/28/18 0437 12/28/18 0437 12/28/18 0437 12/28/18 0437 --   Oral Monitor Sitting Right arm       Pain Score       12/28/18 0500       Worst Possible Pain           Vitals:    12/28/18 0437 12/28/18 0720   BP: 119/77 (!) 89/50   Pulse: 93 78   Patient Position - Orthostatic VS: Sitting Lying       Visual Acuity      ED Medications  Medications   metoclopramide (REGLAN) injection 10 mg (10 mg Intravenous Given 12/28/18 0557)   diphenhydrAMINE (BENADRYL) injection 25 mg (25 mg Intravenous Given 12/28/18 0556)   sodium chloride 0 9 % bolus 1,000 mL (0 mL Intravenous Stopped 12/28/18 0718)   oxymetazoline (AFRIN) 0 05 % nasal spray 2 spray (2 sprays Each Nare Given 12/28/18 0721)       Diagnostic Studies  Results Reviewed     Procedure Component Value Units Date/Time    Basic metabolic panel [[id number]]  (Abnormal) Collected:  12/28/18 0554    Lab Status:  Final result Specimen:  Blood from Arm, Right Updated:  12/28/18 0703     Sodium 139 mmol/L      Potassium 3 5 mmol/L      Chloride 103 mmol/L      CO2 26 mmol/L      ANION GAP 10 mmol/L      BUN 4 (L) mg/dL      Creatinine 0 76 mg/dL      Glucose 106 mg/dL      Calcium 8 9 mg/dL      eGFR 112 ml/min/1 73sq m     Narrative:         National Kidney Disease Education Program recommendations are as follows:  GFR calculation is accurate only with a steady state creatinine  Chronic Kidney disease less than 60 ml/min/1 73 sq  meters  Kidney failure less than 15 ml/min/1 73 sq  meters      Quantitative hCG [[id number]]  (Abnormal) Collected:  12/28/18 0554    Lab Status:  Final result Specimen:  Blood from Arm, Right Updated:  12/28/18 0702     HCG, Quant 44,921 (H) mIU/mL     Narrative:          Expected Ranges:     Approximate               Approximate HCG  Gestation age          Concentration ( mIU/mL)  _____________          ______________________   [facility]                      HCG values  0 2-1                       5-50  1-2                           2-3                         100-5000  3-4                         500-62510  4-5                         1000-64233  5-6                         [id number]  6-8                         [id number]  8-12                        [id number]    Protime-INR [[id number]]  (Normal) Collected:  12/28/18 0554    Lab Status:  Final result Specimen:  Blood from Arm, Right Updated:  12/28/18 0617     Protime 13 9 seconds      INR 1 08    APTT [[id number]]  (Normal) Collected:  12/28/18 0554    Lab Status:  Final result Specimen:  Blood from Arm, Right Updated:  12/28/18 0617     PTT 33 seconds     CBC and differential [[id number]]  (Abnormal) Collected:  12/28/18 0554    Lab Status:  Final result Specimen:  Blood from Arm, Right Updated:  12/28/18 0603     WBC 10 91 (H) Thousand/uL      RBC 4 04 Million/uL      Hemoglobin 12 5 g/dL      Hematocrit 37 2 %      MCV 92 fL      MCH 30 9 pg      MCHC 33 6 g/dL      RDW 13 3 %      MPV 10 0 fL      Platelets 559 Thousands/uL      nRBC 0 /100 WBCs      Neutrophils Relative 71 %      Immat GRANS % 0 %      Lymphocytes Relative 23 %      Monocytes Relative 4 %      Eosinophils Relative 2 %      Basophils Relative 0 %      Neutrophils Absolute 7 72 (H) Thousands/µL      Immature Grans Absolute 0 04 Thousand/uL      Lymphocytes Absolute 2 46 Thousands/µL      Monocytes Absolute 0 48 Thousand/µL      Eosinophils Absolute 0 19 Thousand/µL      Basophils Absolute 0 02 Thousands/µL                  No orders to display              Procedures  Procedures       Phone Contacts  ED Phone Contact    ED Course  ED Course as of Dec 29 1519   Fri Dec 28, 2018   0626 Patient denies head CT without contrast; radiation risks discussed with patient and patient was given ample time to consider risks and benefits  Patient denies head CT without contrast    0711 Patient verbalizing feeling better status post medication delivery; patient verbalize request to go home  Will have patient follow up with Neurology for migraine control                                MDM  Number of Diagnoses or Management Options  Facial pain: minor  Headache: minor  Diagnosis management comments: Patient denied having CT of head without contrast after being presented with possible radiation pregnancy complications both verbal and nonverbal; Patient was given 20 min to make a decision to have head CT performed, and patient verbally declined having had CT scan performed    Positive qualitative HCG  Negative clinical blood labs  Patient verbalizes improvement of headache symptoms to 2/10 from 10/10 pain status post medication delivery  Patient is smiling and opening eyes with no squinting status post medication delivery  Patient verbally requests to be discharged to home care  INO counseled patient to follow up with Neurology for migraine control through pregnancy  Counseled patient on avoiding migraine triggers  Follow-up with Neurology  Follow-up with PCP  Follow up with emergency department if symptoms persist or exacerbate  Patient demonstrates verbal understanding of all clinical lab data, discharge instructions, and follow-up  Amount and/or Complexity of Data Reviewed  Clinical lab tests: ordered and reviewed  Review and summarize past medical records: yes  Discuss the patient with other providers: yes (Dr Esthela Paula)    Risk of Complications, Morbidity, and/or Mortality  Presenting problems: moderate    Patient Progress  Patient progress: stable    CritCare Time    Disposition  Final diagnoses:   Headache - secondary to bilateral frontal and maxillary pain   Facial pain     Time reflects when diagnosis was documented in both MDM as applicable and the Disposition within this note     Time User Action Codes Description Comment    12/28/2018  7:12 AM Emerald  Add [R51] Headache     12/28/2018  7:12 AM Emerald  Add [R51] Facial pain     12/28/2018  7:14 AM Emerald  Modify [R51] Headache secondary to bilateral frontal and maxillary pain      ED Disposition     ED Disposition Condition Comment    Discharge  [facility] discharge to home/self care      Condition at discharge: Stable        Follow-up Information     Follow up With Specialties Details Why Contact Info Additional Information    Neurology [facility] Neurology Call in 2 days for further evaluation of symptoms [address] [facility] Neurology [facility], [address] [address], [place] Call in 2 days for further evaluation of symptoms [address] [facility] [address] [phone] [facility], [address], [place], [address] Emergency Department Emergency Medicine Go to As needed [address] [facility] [address] [phone] [place] [facility], [address], [place], [address]          Discharge Medication List as of 12/28/2018  7:16 AM      CONTINUE these medications which have NOT CHANGED    Details   albuterol (PROVENTIL HFA,VENTOLIN HFA) 90 mcg/act inhaler Inhale 2 puffs every 6 (six) hours as needed for wheezing, Historical Med           No discharge procedures on file      ED Provider  Electronically Signed by           Do Hawk PA-C  12/29/18 5339

## 2019-01-04 ENCOUNTER — HOSPITAL ENCOUNTER (EMERGENCY)
Facility: HOSPITAL | Age: 24
Discharge: HOME | End: 2019-01-04
Attending: EMERGENCY MEDICINE
Payer: COMMERCIAL

## 2019-01-04 VITALS
SYSTOLIC BLOOD PRESSURE: 101 MMHG | WEIGHT: 179.9 LBS | RESPIRATION RATE: 16 BRPM | TEMPERATURE: 97.6 F | BODY MASS INDEX: 25.09 KG/M2 | DIASTOLIC BLOOD PRESSURE: 59 MMHG | HEART RATE: 75 BPM | OXYGEN SATURATION: 100 %

## 2019-01-04 DIAGNOSIS — O21.9 NAUSEA AND VOMITING IN PREGNANCY: Primary | ICD-10-CM

## 2019-01-04 DIAGNOSIS — O26.899 ABDOMINAL PAIN AFFECTING PREGNANCY: ICD-10-CM

## 2019-01-04 DIAGNOSIS — R10.9 ABDOMINAL PAIN AFFECTING PREGNANCY: ICD-10-CM

## 2019-01-04 LAB
ALBUMIN SERPL BCP-MCNC: 3.3 G/DL (ref 3.5–5)
ALP SERPL-CCNC: 76 U/L (ref 46–116)
ALT SERPL W P-5'-P-CCNC: 17 U/L (ref 12–78)
ANION GAP SERPL CALCULATED.3IONS-SCNC: 11 MMOL/L (ref 4–13)
AST SERPL W P-5'-P-CCNC: 12 U/L (ref 5–45)
BASOPHILS # BLD AUTO: 0.02 THOUSANDS/ΜL (ref 0–0.1)
BASOPHILS NFR BLD AUTO: 0 % (ref 0–1)
BILIRUB SERPL-MCNC: 0.3 MG/DL (ref 0.2–1)
BUN SERPL-MCNC: 5 MG/DL (ref 5–25)
CALCIUM SERPL-MCNC: 9.2 MG/DL (ref 8.3–10.1)
CHLORIDE SERPL-SCNC: 103 MMOL/L (ref 100–108)
CO2 SERPL-SCNC: 24 MMOL/L (ref 21–32)
CREAT SERPL-MCNC: 0.84 MG/DL (ref 0.6–1.3)
EOSINOPHIL # BLD AUTO: 0.23 THOUSAND/ΜL (ref 0–0.61)
EOSINOPHIL NFR BLD AUTO: 2 % (ref 0–6)
ERYTHROCYTE [DISTWIDTH] IN BLOOD BY AUTOMATED COUNT: 13.3 % (ref 11.6–15.1)
GFR SERPL CREATININE-BSD FRML MDRD: 99 ML/MIN/1.73SQ M
GLUCOSE SERPL-MCNC: 78 MG/DL (ref 65–140)
HCT VFR BLD AUTO: 42.9 % (ref 34.8–46.1)
HGB BLD-MCNC: 14.6 G/DL (ref 11.5–15.4)
IMM GRANULOCYTES # BLD AUTO: 0.06 THOUSAND/UL (ref 0–0.2)
IMM GRANULOCYTES NFR BLD AUTO: 1 % (ref 0–2)
LYMPHOCYTES # BLD AUTO: 2.03 THOUSANDS/ΜL (ref 0.6–4.47)
LYMPHOCYTES NFR BLD AUTO: 16 % (ref 14–44)
MCH RBC QN AUTO: 31.1 PG (ref 26.8–34.3)
MCHC RBC AUTO-ENTMCNC: 34 G/DL (ref 31.4–37.4)
MCV RBC AUTO: 92 FL (ref 82–98)
MONOCYTES # BLD AUTO: 0.63 THOUSAND/ΜL (ref 0.17–1.22)
MONOCYTES NFR BLD AUTO: 5 % (ref 4–12)
NEUTROPHILS # BLD AUTO: 9.52 THOUSANDS/ΜL (ref 1.85–7.62)
NEUTS SEG NFR BLD AUTO: 76 % (ref 43–75)
NRBC BLD AUTO-RTO: 0 /100 WBCS
PLATELET # BLD AUTO: 260 THOUSANDS/UL (ref 149–390)
PMV BLD AUTO: 9.6 FL (ref 8.9–12.7)
POTASSIUM SERPL-SCNC: 4.2 MMOL/L (ref 3.5–5.3)
PROT SERPL-MCNC: 7.7 G/DL (ref 6.4–8.2)
RBC # BLD AUTO: 4.69 MILLION/UL (ref 3.81–5.12)
SODIUM SERPL-SCNC: 138 MMOL/L (ref 136–145)
WBC # BLD AUTO: 12.49 THOUSAND/UL (ref 4.31–10.16)

## 2019-01-04 PROCEDURE — 80053 COMPREHEN METABOLIC PANEL: CPT | Performed by: EMERGENCY MEDICINE

## 2019-01-04 PROCEDURE — 85025 COMPLETE CBC W/AUTO DIFF WBC: CPT | Performed by: EMERGENCY MEDICINE

## 2019-01-04 PROCEDURE — 96375 TX/PRO/DX INJ NEW DRUG ADDON: CPT

## 2019-01-04 PROCEDURE — 96361 HYDRATE IV INFUSION ADD-ON: CPT

## 2019-01-04 PROCEDURE — 96374 THER/PROPH/DIAG INJ IV PUSH: CPT

## 2019-01-04 PROCEDURE — 99283 EMERGENCY DEPT VISIT LOW MDM: CPT

## 2019-01-04 PROCEDURE — 36415 COLL VENOUS BLD VENIPUNCTURE: CPT | Performed by: EMERGENCY MEDICINE

## 2019-01-04 RX ORDER — FAMOTIDINE 20 MG
1 TABLET ORAL DAILY
Qty: 60 EACH | Refills: 0 | Status: ON HOLD | OUTPATIENT
Start: 2019-01-04 | End: 2019-01-29

## 2019-01-04 RX ORDER — PYRIDOXINE HCL (VITAMIN B6) 25 MG
25 TABLET ORAL DAILY
Qty: 30 TABLET | Refills: 0 | Status: SHIPPED | OUTPATIENT
Start: 2019-01-04 | End: 2019-03-21

## 2019-01-04 RX ORDER — DIPHENHYDRAMINE HYDROCHLORIDE 50 MG/ML
25 INJECTION INTRAMUSCULAR; INTRAVENOUS ONCE
Status: COMPLETED | OUTPATIENT
Start: 2019-01-04 | End: 2019-01-04

## 2019-01-04 RX ORDER — METOCLOPRAMIDE HYDROCHLORIDE 5 MG/ML
5 INJECTION INTRAMUSCULAR; INTRAVENOUS ONCE
Status: COMPLETED | OUTPATIENT
Start: 2019-01-04 | End: 2019-01-04

## 2019-01-04 RX ORDER — ACETAMINOPHEN 325 MG/1
650 TABLET ORAL ONCE
Status: COMPLETED | OUTPATIENT
Start: 2019-01-04 | End: 2019-01-04

## 2019-01-04 RX ADMIN — SODIUM CHLORIDE 1000 ML: 0.9 INJECTION, SOLUTION INTRAVENOUS at 14:58

## 2019-01-04 RX ADMIN — METOCLOPRAMIDE 5 MG: 5 INJECTION, SOLUTION INTRAMUSCULAR; INTRAVENOUS at 16:08

## 2019-01-04 RX ADMIN — DIPHENHYDRAMINE HYDROCHLORIDE 25 MG: 50 INJECTION INTRAMUSCULAR; INTRAVENOUS at 16:08

## 2019-01-04 RX ADMIN — ACETAMINOPHEN 650 MG: 325 TABLET, FILM COATED ORAL at 16:07

## 2019-01-04 NOTE — DISCHARGE INSTRUCTIONS
Abdominal Pain in Pregnancy   WHAT YOU NEED TO KNOW:   Abdominal pain during pregnancy is common  Some of the causes include heartburn, constipation, gas, false labor, and round ligament pain  Round ligament pain is caused by stretching of the ligaments that support your uterus  Abdominal pain may be caused by a health problem, such as a stomach virus or appendicitis (inflammation of the appendix)  The pain may also be caused by a problem with your pregnancy, such as a threatened miscarriage or  labor  DISCHARGE INSTRUCTIONS:   Follow up with your obstetrician within 3 days:  Write down your questions so you remember to ask them during your visits  Self-care:   · Rest may help to relieve round ligament pain  Ask your healthcare provider about other ways to relieve this pain, such as a supportive belt or pregnancy exercises  · Use a heating pad set to the lowest setting or a hot compress to apply heat to your abdomen  Do this for 20 to 30 minutes every 2 hours for as many days as directed  · Avoid quick changes in position or movements that cause pain  · Do not lie flat in bed or bend over if you have heartburn  Ask your obstetrician if you should make any changes to the foods you eat  Ask if you can take any medicines for heartburn  · Eat more fiber and drink more liquids to relieve constipation  Fiber is found in fruits, vegetables, and whole-grain foods, such as whole-wheat bread and cereals  Ask how much liquid to drink each day and which liquids are best for you  Contact your obstetrician if:  · You continue to have abdominal pain that cannot be relieved  · You have a fever  · You have questions or concerns about your condition or care  Return to the emergency department if:   · You have sudden, severe pain or cramps that are so bad that you cannot walk or talk  · You have a fast heartbeat, shortness of breath, and you feel lightheaded or faint       · You have vaginal bleeding or discharge  · You have nausea, vomiting, fever, and severe pain on your right side  © 2017 [address] Information is for End User's use only and may not be sold, redistributed or otherwise used for commercial purposes  All illustrations and images included in CareNotes® are the copyrighted property of A [employer]  or Adam Rosa  The above information is an  only  It is not intended as medical advice for individual conditions or treatments  Talk to your doctor, nurse or pharmacist before following any medical regimen to see if it is safe and effective for you  Acute Nausea and Vomiting   AMBULATORY CARE:   Acute nausea and vomiting  starts suddenly, gets worse quickly, and lasts a short time  Common causes include pregnancy, alcohol, infection, and medicines  A head injury, heart attack, or inner ear imbalance can also cause acute nausea and vomiting  Seek care immediately if:   · You see blood in your vomit or your bowel movements  · You have sudden, severe pain in your chest and upper abdomen after hard vomiting or retching  · You have swelling in your neck and chest      · You are dizzy, cold, and thirsty and your eyes and mouth are dry  · You are urinating very little or not at all  · You have muscle weakness, leg cramps, and trouble breathing  · Your heart is beating much faster than normal      · You continue to vomit for more than 48 hours  Contact your healthcare provider if:   · You have frequent dry heaves (vomiting but nothing comes out)  · Your nausea and vomiting does not get better or go away after you use medicine  · You have questions or concerns about your condition or treatment  Treatment for acute nausea and vomiting  may include medicines to calm your stomach and stop the vomiting  You may need IV fluids if you are dehydrated  Prevent or manage acute nausea and vomiting:   · Do not drink alcohol    Alcohol may upset or irritate your stomach  Too much alcohol can also cause acute nausea and vomiting  · Control stress  Headaches due to stress may cause nausea and vomiting  Find ways to relax and manage your stress  Get more rest and sleep  · Drink more liquids as directed  Vomiting can lead to dehydration  It is important to drink more liquids to help replace lost body fluids  Ask your healthcare provider how much liquid to drink each day and which liquids are best for you  Your provider may recommend that you drink an oral rehydration solution (ORS)  ORS contains water, salts, and sugar that are needed to replace the lost body fluids  Ask what kind of ORS to use, how much to drink, and where to get it  · Eat smaller meals, more often  Eat small amounts of food every 2 to 3 hours, even if you are not hungry  Food in your stomach may decrease your nausea  · Talk to your healthcare provider before you take over-the-counter (OTC) medicines  These medicines can cause serious problems if you use certain other medicines, or you have a medical condition  You may have problems if you use too much or use them for longer than the label says  Follow directions on the label carefully  Follow up with your healthcare provider as directed:  Write down your questions so you remember to ask them during your visits  © 2017 [address]  Information is for End User's use only and may not be sold, redistributed or otherwise used for commercial purposes  All illustrations and images included in CareNotes® are the copyrighted property of A [employer]  or Adam Rosa  The above information is an  only  It is not intended as medical advice for individual conditions or treatments  Talk to your doctor, nurse or pharmacist before following any medical regimen to see if it is safe and effective for you

## 2019-01-04 NOTE — ED NOTES
Discharge instructions and medications reviewed  Pt with no questions or concerns at this time   Pt ambulatory off unit with steady gait      Linda Ramos RN  01/04/19 2406

## 2019-01-04 NOTE — ED PROVIDER NOTES
History  Chief Complaint   Patient presents with    Vomiting     patient is c/o vomiting, lower abd pain, dizziness and SOB that began this am  patient is 14 weeks pregnant  denies vaginal bleeding  c/o cramping  has not seen OBGYN yet  History provided by:  Patient  Vomiting   Severity:  Mild  Duration:  2 days  Timing:  Intermittent  Quality:  Stomach contents  Progression:  Partially resolved  Chronicity:  Recurrent  Recent urination:  Normal  Context: not self-induced    Relieved by:  Nothing  Worsened by:  Nothing  Ineffective treatments:  None tried  Associated symptoms: abdominal pain and myalgias    Associated symptoms: no chills, no cough, no diarrhea, no fever, no sore throat and no URI    Risk factors: pregnant (about 14 weeks by dates)        Prior to Admission Medications   Prescriptions Last Dose Informant Patient Reported? Taking? albuterol (PROVENTIL HFA,VENTOLIN HFA) 90 mcg/act inhaler   Yes No   Sig: Inhale 2 puffs every 6 (six) hours as needed for wheezing      Facility-Administered Medications: None       Past Medical History:   Diagnosis Date    Anxiety     Asthma     Cancer ([facility] )     skin cancer    Depression     Dissociative identity disorder ([facility] )     Intermittent explosive disorder in adult     Migraine     PTSD (post-traumatic stress disorder)        Past Surgical History:   Procedure Laterality Date    ADENOIDECTOMY      CYST REMOVAL      nares    EAR FOREIGN BODY REMOVAL      SKIN CANCER EXCISION      TONSILLECTOMY         History reviewed  No pertinent family history  I have reviewed and agree with the history as documented  Social History   Substance Use Topics    Smoking status: Current Every Day Smoker     Packs/day: 0 50     Types: Cigarettes    Smokeless tobacco: Never Used    Alcohol use No        Review of Systems   Constitutional: Negative for chills and fever  HENT: Negative for sore throat  Respiratory: Negative for cough      Gastrointestinal: Positive for abdominal pain and vomiting  Negative for diarrhea  Musculoskeletal: Positive for myalgias  All other systems reviewed and are negative  Physical Exam  Physical Exam   Constitutional: She appears well-developed and well-nourished  HENT:   Head: Normocephalic and atraumatic  Eyes: Pupils are equal, round, and reactive to light  EOM are normal    Neck: Neck supple  Cardiovascular: Normal rate and regular rhythm  Pulmonary/Chest: Effort normal and breath sounds normal  No respiratory distress  She has no wheezes  Abdominal: Soft  Bowel sounds are normal  She exhibits no distension  There is no tenderness  Musculoskeletal: She exhibits no edema  Neurological: She is alert  No cranial nerve deficit or sensory deficit  She exhibits normal muscle tone  Coordination normal    Psychiatric: Her mood appears anxious  Vitals reviewed        Vital Signs  ED Triage Vitals [01/04/19 1445]   Temperature Pulse Respirations Blood Pressure SpO2   97 6 °F (36 4 °C) 87 16 122/73 100 %      Temp Source Heart Rate Source Patient Position - Orthostatic VS BP Location FiO2 (%)   Oral Monitor Sitting Right arm --      Pain Score       7           Vitals:    01/04/19 1445 01/04/19 1650   BP: 122/73 101/59   Pulse: 87 75   Patient Position - Orthostatic VS: Sitting        Visual Acuity      ED Medications  Medications   sodium chloride 0 9 % bolus 1,000 mL (0 mL Intravenous Stopped 1/4/19 1607)   diphenhydrAMINE (BENADRYL) injection 25 mg (25 mg Intravenous Given 1/4/19 1608)   acetaminophen (TYLENOL) tablet 650 mg (650 mg Oral Given 1/4/19 1607)   metoclopramide (REGLAN) injection 5 mg (5 mg Intravenous Given 1/4/19 1608)       Diagnostic Studies  Results Reviewed     Procedure Component Value Units Date/Time    Comprehensive metabolic panel [[id number]]  (Abnormal) Collected:  01/04/19 1458    Lab Status:  Final result Specimen:  Blood from Arm, Left Updated:  01/04/19 1530     Sodium 138 mmol/L Potassium 4 2 mmol/L      Chloride 103 mmol/L      CO2 24 mmol/L      ANION GAP 11 mmol/L      BUN 5 mg/dL      Creatinine 0 84 mg/dL      Glucose 78 mg/dL      Calcium 9 2 mg/dL      AST 12 U/L      ALT 17 U/L      Alkaline Phosphatase 76 U/L      Total Protein 7 7 g/dL      Albumin 3 3 (L) g/dL      Total Bilirubin 0 30 mg/dL      eGFR 99 ml/min/1 73sq m     Narrative:         National Kidney Disease Education Program recommendations are as follows:  GFR calculation is accurate only with a steady state creatinine  Chronic Kidney disease less than 60 ml/min/1 73 sq  meters  Kidney failure less than 15 ml/min/1 73 sq  meters      CBC and differential [[id number]]  (Abnormal) Collected:  01/04/19 1458    Lab Status:  Final result Specimen:  Blood from Arm, Left Updated:  01/04/19 1503     WBC 12 49 (H) Thousand/uL      RBC 4 69 Million/uL      Hemoglobin 14 6 g/dL      Hematocrit 42 9 %      MCV 92 fL      MCH 31 1 pg      MCHC 34 0 g/dL      RDW 13 3 %      MPV 9 6 fL      Platelets 155 Thousands/uL      nRBC 0 /100 WBCs      Neutrophils Relative 76 (H) %      Immat GRANS % 1 %      Lymphocytes Relative 16 %      Monocytes Relative 5 %      Eosinophils Relative 2 %      Basophils Relative 0 %      Neutrophils Absolute 9 52 (H) Thousands/µL      Immature Grans Absolute 0 06 Thousand/uL      Lymphocytes Absolute 2 03 Thousands/µL      Monocytes Absolute 0 63 Thousand/µL      Eosinophils Absolute 0 23 Thousand/µL      Basophils Absolute 0 02 Thousands/µL     POCT pregnancy, urine [[id number]]     Lab Status:  No result     UA w Reflex to Microscopic w Reflex to Culture [[id number]]     Lab Status:  No result Specimen:  Urine                  No orders to display              Procedures  Pelvic Ultrasound  Performed by: Daniel Sero  Authorized by: Daniel Sero     Procedure details:     Indications: evaluate for IUP and pregnant with abdominal pain      Assess:  Fetal viability    Technique:  Transabdominal obstetric (limited) exam  Uterine findings:     Intrauterine pregnancy: identified      Fetal heart rate: identified      Fetal heart rate (bpm):  145               Phone Contacts  ED Phone Contact    ED Course                               MDM  Number of Diagnoses or Management Options  Abdominal pain affecting pregnancy: new and requires workup  Nausea and vomiting in pregnancy: new and requires workup     Amount and/or Complexity of Data Reviewed  Clinical lab tests: ordered and reviewed  Discuss the patient with other providers: yes  Independent visualization of images, tracings, or specimens: yes    Risk of Complications, Morbidity, and/or Mortality  Presenting problems: high    Patient Progress  Patient progress: stable    CritCare Time    Disposition  Final diagnoses:   Nausea and vomiting in pregnancy   Abdominal pain affecting pregnancy     Time reflects when diagnosis was documented in both MDM as applicable and the Disposition within this note     Time User Action Codes Description Comment    2019  5:43 PM Cheryle, [address] [O21 9] Nausea and vomiting in pregnancy     2019  5:43 PM Cheryle, [address] [O26 899,  R10 9] Abdominal pain affecting pregnancy       ED Disposition     ED Disposition Condition Comment    Discharge  Tori Gaines discharge to home/self care      Condition at discharge: Good        Follow-up Information     Follow up With Specialties Details Why Contact Info Additional Information    [address] [facility] Emergency Department Emergency Medicine  If symptoms worsen [address] [facility] [address] ED, [address] [facility], [facility], [address]    Edwin Valente MD Obstetrics and Gynecology, Obstetrics, Gynecology  Please call to make sure you arrange for appropriate pre-izabel care and take prenatals and obstain from smoking Jorge 19  [address] [facility]  [address]  [phone] Patient's Medications   Discharge Prescriptions    DOXYLAMINE (UNISON) 25 MG TABLET    Take 0 5 tablets (12 5 mg total) by mouth 2 (two) times a day as needed for sleep or nausea       Start Date: 1/4/2019  End Date: --       Order Dose: 12 5 mg       Quantity: 30 tablet    Refills: 0    PRENATAL VIT-FE FUMARATE-FA (PRENATAL COMPLETE) 14-0 4 MG TABS    Take 1 capsule by mouth daily       Start Date: 1/4/2019  End Date: --       Order Dose: 1 capsule       Quantity: 60 each    Refills: 0    PYRIDOXINE (B-6) 25 MG TABLET    Take 1 tablet (25 mg total) by mouth daily       Start Date: 1/4/2019  End Date: --       Order Dose: 25 mg       Quantity: 30 tablet    Refills: 0     No discharge procedures on file      ED Provider  Electronically Signed by           Trey Perez MD  01/04/19 8471

## 2019-01-04 NOTE — ED NOTES
Pt states nausea is feeling better, eating some crackers and drinking PO fluids at this time        Michael Kyle RN  01/04/19 4186

## 2019-01-26 ENCOUNTER — HOSPITAL ENCOUNTER (EMERGENCY)
Facility: HOSPITAL | Age: 24
End: 2019-01-26
Attending: EMERGENCY MEDICINE | Admitting: EMERGENCY MEDICINE
Payer: COMMERCIAL

## 2019-01-26 ENCOUNTER — HOSPITAL ENCOUNTER (INPATIENT)
Facility: HOSPITAL | Age: 24
LOS: 3 days | Discharge: HOME | DRG: 566 | End: 2019-01-29
Attending: INTERNAL MEDICINE | Admitting: INTERNAL MEDICINE
Payer: COMMERCIAL

## 2019-01-26 ENCOUNTER — APPOINTMENT (EMERGENCY)
Dept: RADIOLOGY | Facility: HOSPITAL | Age: 24
End: 2019-01-26
Payer: COMMERCIAL

## 2019-01-26 VITALS
WEIGHT: 184.08 LBS | BODY MASS INDEX: 25.77 KG/M2 | HEIGHT: 71 IN | SYSTOLIC BLOOD PRESSURE: 96 MMHG | HEART RATE: 93 BPM | TEMPERATURE: 99 F | OXYGEN SATURATION: 100 % | DIASTOLIC BLOOD PRESSURE: 59 MMHG | RESPIRATION RATE: 20 BRPM

## 2019-01-26 DIAGNOSIS — O26.899 ABDOMINAL PAIN AFFECTING PREGNANCY: ICD-10-CM

## 2019-01-26 DIAGNOSIS — R07.9 LEFT SIDED CHEST PAIN: ICD-10-CM

## 2019-01-26 DIAGNOSIS — F17.200 SMOKER: ICD-10-CM

## 2019-01-26 DIAGNOSIS — R10.9 ABDOMINAL PAIN AFFECTING PREGNANCY: ICD-10-CM

## 2019-01-26 DIAGNOSIS — J45.909 MODERATE ASTHMA, UNSPECIFIED WHETHER COMPLICATED, UNSPECIFIED WHETHER PERSISTENT: ICD-10-CM

## 2019-01-26 DIAGNOSIS — J18.9 PNEUMONIA OF LEFT LOWER LOBE DUE TO INFECTIOUS ORGANISM: ICD-10-CM

## 2019-01-26 DIAGNOSIS — Z34.92 SECOND TRIMESTER PREGNANCY: ICD-10-CM

## 2019-01-26 DIAGNOSIS — Z3A.20 20 WEEKS GESTATION OF PREGNANCY: Primary | ICD-10-CM

## 2019-01-26 DIAGNOSIS — J18.9 BILATERAL PNEUMONIA: Primary | ICD-10-CM

## 2019-01-26 DIAGNOSIS — R06.00 DYSPNEA: ICD-10-CM

## 2019-01-26 PROBLEM — F41.9 ANXIETY: Status: ACTIVE | Noted: 2017-03-28

## 2019-01-26 PROBLEM — F19.11 HISTORY OF DRUG ABUSE (HCC): Status: ACTIVE | Noted: 2017-03-28

## 2019-01-26 PROBLEM — F32.A DEPRESSIVE DISORDER: Status: ACTIVE | Noted: 2017-03-28

## 2019-01-26 PROBLEM — IMO0002: Status: ACTIVE | Noted: 2017-03-28

## 2019-01-26 PROBLEM — F44.81 MULTIPLE PERSONALITY DISORDER (HCC): Status: ACTIVE | Noted: 2017-03-28

## 2019-01-26 LAB
ALBUMIN SERPL BCP-MCNC: 2.2 G/DL (ref 3.5–5)
ALP SERPL-CCNC: 66 U/L (ref 46–116)
ALT SERPL W P-5'-P-CCNC: 8 U/L (ref 12–78)
ANION GAP SERPL CALCULATED.3IONS-SCNC: 10 MMOL/L (ref 4–13)
APTT PPP: 27 SECONDS (ref 26–38)
AST SERPL W P-5'-P-CCNC: 17 U/L (ref 5–45)
BASOPHILS # BLD AUTO: 0.01 THOUSANDS/ΜL (ref 0–0.1)
BASOPHILS NFR BLD AUTO: 0 % (ref 0–1)
BILIRUB DIRECT SERPL-MCNC: 0.03 MG/DL (ref 0–0.2)
BILIRUB SERPL-MCNC: 0.2 MG/DL (ref 0.2–1)
BUN SERPL-MCNC: 6 MG/DL (ref 5–25)
CALCIUM SERPL-MCNC: 8.1 MG/DL (ref 8.3–10.1)
CHLORIDE SERPL-SCNC: 107 MMOL/L (ref 100–108)
CO2 SERPL-SCNC: 22 MMOL/L (ref 21–32)
CREAT SERPL-MCNC: 0.78 MG/DL (ref 0.6–1.3)
DEPRECATED D DIMER PPP: 2725 NG/ML (FEU)
EOSINOPHIL # BLD AUTO: 0.05 THOUSAND/ΜL (ref 0–0.61)
EOSINOPHIL NFR BLD AUTO: 0 % (ref 0–6)
ERYTHROCYTE [DISTWIDTH] IN BLOOD BY AUTOMATED COUNT: 13.9 % (ref 11.6–15.1)
FLUAV AG SPEC QL IA: NEGATIVE
FLUAV AG SPEC QL: NORMAL
FLUBV AG SPEC QL IA: NEGATIVE
FLUBV AG SPEC QL: NORMAL
GFR SERPL CREATININE-BSD FRML MDRD: 108 ML/MIN/1.73SQ M
GLUCOSE SERPL-MCNC: 75 MG/DL (ref 65–140)
HCT VFR BLD AUTO: 27.8 % (ref 34.8–46.1)
HGB BLD-MCNC: 9.2 G/DL (ref 11.5–15.4)
IMM GRANULOCYTES # BLD AUTO: 0.07 THOUSAND/UL (ref 0–0.2)
IMM GRANULOCYTES NFR BLD AUTO: 1 % (ref 0–2)
INR PPP: 1.04 (ref 0.86–1.17)
L PNEUMO1 AG UR QL IA.RAPID: NEGATIVE
LACTATE SERPL-SCNC: 1.7 MMOL/L (ref 0.5–2)
LACTATE SERPL-SCNC: 2.1 MMOL/L (ref 0.5–2)
LIPASE SERPL-CCNC: 76 U/L (ref 73–393)
LYMPHOCYTES # BLD AUTO: 2.74 THOUSANDS/ΜL (ref 0.6–4.47)
LYMPHOCYTES NFR BLD AUTO: 21 % (ref 14–44)
MAGNESIUM SERPL-MCNC: 1.7 MG/DL (ref 1.6–2.6)
MCH RBC QN AUTO: 30.6 PG (ref 26.8–34.3)
MCHC RBC AUTO-ENTMCNC: 33.1 G/DL (ref 31.4–37.4)
MCV RBC AUTO: 92 FL (ref 82–98)
MONOCYTES # BLD AUTO: 0.62 THOUSAND/ΜL (ref 0.17–1.22)
MONOCYTES NFR BLD AUTO: 5 % (ref 4–12)
NEUTROPHILS # BLD AUTO: 9.81 THOUSANDS/ΜL (ref 1.85–7.62)
NEUTS SEG NFR BLD AUTO: 73 % (ref 43–75)
NRBC BLD AUTO-RTO: 0 /100 WBCS
NT-PROBNP SERPL-MCNC: 685 PG/ML
PLATELET # BLD AUTO: 266 THOUSANDS/UL (ref 149–390)
PMV BLD AUTO: 9.3 FL (ref 8.9–12.7)
POTASSIUM SERPL-SCNC: 3.9 MMOL/L (ref 3.5–5.3)
PROCALCITONIN SERPL-MCNC: 54.53 NG/ML
PROT SERPL-MCNC: 6.2 G/DL (ref 6.4–8.2)
PROTHROMBIN TIME: 13.5 SECONDS (ref 11.8–14.2)
RBC # BLD AUTO: 3.01 MILLION/UL (ref 3.81–5.12)
RSV B RNA SPEC QL NAA+PROBE: NORMAL
S PNEUM AG UR QL: NEGATIVE
SODIUM SERPL-SCNC: 139 MMOL/L (ref 136–145)
TROPONIN I SERPL-MCNC: <0.02 NG/ML
TSH SERPL DL<=0.05 MIU/L-ACNC: 0.9 UIU/ML (ref 0.36–3.74)
WBC # BLD AUTO: 13.3 THOUSAND/UL (ref 4.31–10.16)

## 2019-01-26 PROCEDURE — 96375 TX/PRO/DX INJ NEW DRUG ADDON: CPT

## 2019-01-26 PROCEDURE — 96367 TX/PROPH/DG ADDL SEQ IV INF: CPT

## 2019-01-26 PROCEDURE — 71046 X-RAY EXAM CHEST 2 VIEWS: CPT

## 2019-01-26 PROCEDURE — 96361 HYDRATE IV INFUSION ADD-ON: CPT

## 2019-01-26 PROCEDURE — 83880 ASSAY OF NATRIURETIC PEPTIDE: CPT | Performed by: EMERGENCY MEDICINE

## 2019-01-26 PROCEDURE — 83735 ASSAY OF MAGNESIUM: CPT | Performed by: EMERGENCY MEDICINE

## 2019-01-26 PROCEDURE — 99285 EMERGENCY DEPT VISIT HI MDM: CPT

## 2019-01-26 PROCEDURE — 83605 ASSAY OF LACTIC ACID: CPT | Performed by: EMERGENCY MEDICINE

## 2019-01-26 PROCEDURE — 80048 BASIC METABOLIC PNL TOTAL CA: CPT | Performed by: EMERGENCY MEDICINE

## 2019-01-26 PROCEDURE — 96365 THER/PROPH/DIAG IV INF INIT: CPT

## 2019-01-26 PROCEDURE — 84484 ASSAY OF TROPONIN QUANT: CPT | Performed by: EMERGENCY MEDICINE

## 2019-01-26 PROCEDURE — 87040 BLOOD CULTURE FOR BACTERIA: CPT | Performed by: EMERGENCY MEDICINE

## 2019-01-26 PROCEDURE — 93005 ELECTROCARDIOGRAM TRACING: CPT

## 2019-01-26 PROCEDURE — 87631 RESP VIRUS 3-5 TARGETS: CPT | Performed by: EMERGENCY MEDICINE

## 2019-01-26 PROCEDURE — 94640 AIRWAY INHALATION TREATMENT: CPT

## 2019-01-26 PROCEDURE — 84145 PROCALCITONIN (PCT): CPT | Performed by: INTERNAL MEDICINE

## 2019-01-26 PROCEDURE — 83690 ASSAY OF LIPASE: CPT | Performed by: EMERGENCY MEDICINE

## 2019-01-26 PROCEDURE — 96376 TX/PRO/DX INJ SAME DRUG ADON: CPT

## 2019-01-26 PROCEDURE — 85730 THROMBOPLASTIN TIME PARTIAL: CPT | Performed by: EMERGENCY MEDICINE

## 2019-01-26 PROCEDURE — 85610 PROTHROMBIN TIME: CPT | Performed by: EMERGENCY MEDICINE

## 2019-01-26 PROCEDURE — 85025 COMPLETE CBC W/AUTO DIFF WBC: CPT | Performed by: EMERGENCY MEDICINE

## 2019-01-26 PROCEDURE — 87449 NOS EACH ORGANISM AG IA: CPT | Performed by: INTERNAL MEDICINE

## 2019-01-26 PROCEDURE — 84443 ASSAY THYROID STIM HORMONE: CPT | Performed by: INTERNAL MEDICINE

## 2019-01-26 PROCEDURE — 80076 HEPATIC FUNCTION PANEL: CPT | Performed by: EMERGENCY MEDICINE

## 2019-01-26 PROCEDURE — 85379 FIBRIN DEGRADATION QUANT: CPT | Performed by: EMERGENCY MEDICINE

## 2019-01-26 PROCEDURE — 36415 COLL VENOUS BLD VENIPUNCTURE: CPT | Performed by: EMERGENCY MEDICINE

## 2019-01-26 RX ORDER — ACETAMINOPHEN 325 MG/1
650 TABLET ORAL ONCE
Status: COMPLETED | OUTPATIENT
Start: 2019-01-26 | End: 2019-01-26

## 2019-01-26 RX ORDER — HYDROMORPHONE HCL/PF 1 MG/ML
1 SYRINGE (ML) INJECTION ONCE
Status: COMPLETED | OUTPATIENT
Start: 2019-01-26 | End: 2019-01-26

## 2019-01-26 RX ORDER — TRAMADOL HYDROCHLORIDE 50 MG/1
50 TABLET ORAL EVERY 8 HOURS PRN
Status: DISCONTINUED | OUTPATIENT
Start: 2019-01-26 | End: 2019-01-27

## 2019-01-26 RX ORDER — SODIUM CHLORIDE 9 MG/ML
125 INJECTION, SOLUTION INTRAVENOUS CONTINUOUS
Status: DISCONTINUED | OUTPATIENT
Start: 2019-01-26 | End: 2019-01-29 | Stop reason: HOSPADM

## 2019-01-26 RX ORDER — NICOTINE 21 MG/24HR
1 PATCH, TRANSDERMAL 24 HOURS TRANSDERMAL DAILY
Status: DISCONTINUED | OUTPATIENT
Start: 2019-01-27 | End: 2019-01-29 | Stop reason: HOSPADM

## 2019-01-26 RX ORDER — ONDANSETRON 2 MG/ML
4 INJECTION INTRAMUSCULAR; INTRAVENOUS ONCE
Status: COMPLETED | OUTPATIENT
Start: 2019-01-26 | End: 2019-01-26

## 2019-01-26 RX ORDER — FAMOTIDINE 20 MG/1
20 TABLET, FILM COATED ORAL 2 TIMES DAILY PRN
Status: DISCONTINUED | OUTPATIENT
Start: 2019-01-26 | End: 2019-01-29 | Stop reason: HOSPADM

## 2019-01-26 RX ORDER — CALCIUM CARBONATE 200(500)MG
1000 TABLET,CHEWABLE ORAL 3 TIMES DAILY PRN
Status: DISCONTINUED | OUTPATIENT
Start: 2019-01-26 | End: 2019-01-29 | Stop reason: HOSPADM

## 2019-01-26 RX ORDER — LIDOCAINE 50 MG/G
1 PATCH TOPICAL ONCE
Status: DISCONTINUED | OUTPATIENT
Start: 2019-01-26 | End: 2019-01-26 | Stop reason: HOSPADM

## 2019-01-26 RX ORDER — ALBUTEROL SULFATE 90 UG/1
2 AEROSOL, METERED RESPIRATORY (INHALATION) EVERY 4 HOURS PRN
Status: DISCONTINUED | OUTPATIENT
Start: 2019-01-26 | End: 2019-01-29 | Stop reason: HOSPADM

## 2019-01-26 RX ORDER — FERROUS SULFATE 325(65) MG
325 TABLET ORAL
Status: DISCONTINUED | OUTPATIENT
Start: 2019-01-27 | End: 2019-01-29 | Stop reason: HOSPADM

## 2019-01-26 RX ORDER — HYDROMORPHONE HCL/PF 1 MG/ML
0.5 SYRINGE (ML) INJECTION ONCE
Status: COMPLETED | OUTPATIENT
Start: 2019-01-26 | End: 2019-01-26

## 2019-01-26 RX ORDER — FLUTICASONE PROPIONATE 220 UG/1
1 AEROSOL, METERED RESPIRATORY (INHALATION) EVERY 12 HOURS SCHEDULED
Status: DISCONTINUED | OUTPATIENT
Start: 2019-01-26 | End: 2019-01-29 | Stop reason: HOSPADM

## 2019-01-26 RX ORDER — ALBUTEROL SULFATE 2.5 MG/3ML
5 SOLUTION RESPIRATORY (INHALATION) ONCE
Status: COMPLETED | OUTPATIENT
Start: 2019-01-26 | End: 2019-01-26

## 2019-01-26 RX ORDER — ACETAMINOPHEN 325 MG/1
650 TABLET ORAL EVERY 6 HOURS PRN
Status: DISCONTINUED | OUTPATIENT
Start: 2019-01-26 | End: 2019-01-29 | Stop reason: HOSPADM

## 2019-01-26 RX ADMIN — LIDOCAINE 1 PATCH: 50 PATCH TOPICAL at 08:46

## 2019-01-26 RX ADMIN — SODIUM CHLORIDE 125 ML/HR: 0.9 INJECTION, SOLUTION INTRAVENOUS at 18:00

## 2019-01-26 RX ADMIN — SODIUM CHLORIDE 1000 ML: 0.9 INJECTION, SOLUTION INTRAVENOUS at 08:47

## 2019-01-26 RX ADMIN — HYDROMORPHONE HYDROCHLORIDE 1 MG: 1 INJECTION, SOLUTION INTRAMUSCULAR; INTRAVENOUS; SUBCUTANEOUS at 08:46

## 2019-01-26 RX ADMIN — TRAMADOL HYDROCHLORIDE 50 MG: 50 TABLET, COATED ORAL at 17:21

## 2019-01-26 RX ADMIN — ALBUTEROL SULFATE 2 PUFF: 90 AEROSOL, METERED RESPIRATORY (INHALATION) at 20:00

## 2019-01-26 RX ADMIN — ACETAMINOPHEN 650 MG: 325 TABLET, FILM COATED ORAL at 19:32

## 2019-01-26 RX ADMIN — CEFTRIAXONE SODIUM 1000 MG: 10 INJECTION, POWDER, FOR SOLUTION INTRAVENOUS at 10:30

## 2019-01-26 RX ADMIN — ONDANSETRON 4 MG: 2 INJECTION INTRAMUSCULAR; INTRAVENOUS at 11:43

## 2019-01-26 RX ADMIN — ACETAMINOPHEN 650 MG: 325 TABLET, FILM COATED ORAL at 11:46

## 2019-01-26 RX ADMIN — HYDROMORPHONE HYDROCHLORIDE 0.5 MG: 1 INJECTION, SOLUTION INTRAMUSCULAR; INTRAVENOUS; SUBCUTANEOUS at 15:14

## 2019-01-26 RX ADMIN — AZITHROMYCIN MONOHYDRATE 500 MG: 500 INJECTION, POWDER, LYOPHILIZED, FOR SOLUTION INTRAVENOUS at 11:00

## 2019-01-26 RX ADMIN — SODIUM CHLORIDE 125 ML/HR: 0.9 INJECTION, SOLUTION INTRAVENOUS at 23:06

## 2019-01-26 RX ADMIN — ALBUTEROL SULFATE 5 MG: 2.5 SOLUTION RESPIRATORY (INHALATION) at 08:46

## 2019-01-26 NOTE — SEPSIS NOTE
Sepsis Note   Pérez Yap 26 y o  female MRN: [id number]  Unit/Bed#: ED 15 Encounter: [id number]            Initial Sepsis Screening     Row Name 01/26/19 1000                Is the patient's history suggestive of a new or worsening infection? (!)  Yes (Proceed)  -MA        Suspected source of infection pneumonia  -MA        Are two or more of the following signs & symptoms of infection both present and new to the patient? (!)  Yes (Proceed)  -MA        Indicate SIRS criteria Tachycardia > 90 bpm;Leukocytosis (WBC > 40731 IJL)  -MA        If the answer is yes to both questions, suspicion of sepsis is present          If severe sepsis is present AND tissue hypoperfusion perists in the hour after fluid resuscitation or lactate > 4, the patient meets criteria for SEPTIC SHOCK          Are any of the following organ dysfunction criteria present within 6 hours of suspected infection and SIRS criteria that are NOT considered to be chronic conditions? (!)  Yes  -MA        Organ dysfunction Lactate > 2 0 mmol/L  -MA        Date of presentation of severe sepsis 01/26/19  -MA        Time of presentation of severe sepsis 1126  -MA        Tissue hypoperfusion persists in the hour after crystalloid fluid administration, evidenced, by either:          Was hypotension present within one hour of the conclusion of crystalloid fluid administration?  No  -MA        Date of presentation of septic shock          Time of presentation of septic shock            User Key  (r) = Recorded By, (t) = Taken By, (c) = Cosigned By    [address] Name Provider Type    MA Poly Glance, DO Physician               Default Flowsheet Data (last 720 hours)      Sepsis Reassess     Row Name 01/26/19 1126                   Repeat Volume Status and Tissue Perfusion Assessment Performed    Repeat Volume Status and Tissue Perfusion Assessment Performed Yes  -MA           Volume Status and Tissue Perfusion Post Fluid Resuscitation * Must Document All *    Vital Signs Reviewed (HR, RR, BP, T) Yes  -MA        Shock Index Reviewed Yes  -MA        Arterial Oxygen Saturation Reviewed (POx, SaO2 or SpO2) Yes (comment %)  -MA        Cardio Normal S1/S2; Regular rate and rhythm  -MA        Pulmonary Normal effort;Clear to auscultation  -MA        Capillary Refill Brisk  -MA        Peripheral Pulses Radial;Dorsalis Pedis  -MA        Peripheral Pulse +2  -MA        Dorsalis Pedis +2  -MA        Skin Warm;Dry  -MA        Urine output assessed Adequate  -MA           *OR*   Intensive Monitoring- Must Document One of the Following Four *:    Vital Signs Reviewed          * Central Venous Pressure (CVP or RAP)          * Central Venous Oxygen (SVO2, ScvO2 or Oxygen saturation via central catheter)          * Bedside Cardiovascular US in IVC diameter and % collapse          * Passive Leg Raise OR Crystalloid Challenge            User Key  (r) = Recorded By, (t) = Taken By, (c) = Cosigned By    Initials Name Provider Type    ELLIE Bar DO Physician

## 2019-01-26 NOTE — H&P
INTERNAL MEDICINE HISTORY AND PHYSICAL  [facility] [address]     NAME: Tori Gaines  AGE: 26 y o  SEX: female  : 1995   MRN: [id number]  ENCOUNTER: [id number]    DATE: 2019  TIME: 4:32 PM    Primary Care Physician: No primary care provider on file  Admitting Provider: Glendy Herrera MD    Chief complaint: chest pain    History of Present Illness     Hailey Licea is a 26 y o  female  with past medical and psychiatric history of asthma, skin cancer status post skin excision, depression, PTSD, intermittent explosive disorder, dissociative identity disorder, currently 19-20 weeks pregnant, who presented to the [facility] emergency department complaining of severe left-sided chest pain and dyspnea  Patient reports 2 days ago she started with pain in her left shoulder that has since progressed to involve her left chest   She reports when she takes a deep breath the pain significantly worsens and feels as though her bones are breaking  She was seen at [facility] 2 days ago and at that time did have a CT scan which showed evidence of pneumonia  And was started on an antibiotic which she does not know  She reports she was then seen at [facility] who did similar workup and started her on prednisone however her pain is significantly worse  She reports feeling short of breath as well  She also states that her whole entire body hurts  She does complain of some lower abdominal pain denies any vaginal bleeding, leakage of vaginal fluid  She reports having fever yesterday but denies fever today  She also reports productive cough for the past several days  On review of systems, she also admits to palpitations  She denies headache, dizziness or near syncope, URI symptoms, hemoptysis, nausea, vomiting, diarrhea, constipation, urinary symptoms, skin rash or color change, extremity swelling or pain, extremity weakness or paresthesia or other focal neurologic deficits  She has received no prenatal care thus far in this pregnancy  Last menstrual period 2018  She is *1 (*had placental abruption and fetal demise at 21 weeks)  She has been feeling the baby move consistently  Review of Systems   Review of Systems    Past Medical History     Past Medical History:   Diagnosis Date    Anxiety     Asthma     [facility] )     skin cancer    Depression     [facility]     Intermittent explosive disorder in adult     Migraine     PTSD (post-traumatic stress disorder)        Past Surgical History     Past Surgical History:   Procedure Laterality Date    ADENOIDECTOMY      CYST REMOVAL      nares    EAR FOREIGN BODY REMOVAL      SKIN CANCER EXCISION      TONSILLECTOMY         Social History     History   Alcohol Use No     History   Drug Use No     History   Smoking Status    Current Every Day Smoker    Packs/day: 0 50    Types: Cigarettes   Smokeless Tobacco    Never Used       Family History   No family history on file  Medications Prior to Admission     Prior to Admission medications    Medication Sig Start Date End Date Taking?  Authorizing Provider   albuterol (PROVENTIL HFA,VENTOLIN HFA) 90 mcg/act inhaler Inhale 2 puffs every 6 (six) hours as needed for wheezing    Historical Provider, MD   doxylamine (UNISON) 25 MG tablet Take 0 5 tablets (12 5 mg total) by mouth 2 (two) times a day as needed for sleep or nausea 19   Radames Bah MD   Prenatal Vit-Fe Fumarate-FA (PRENATAL COMPLETE) 14-0 4 MG TABS Take 1 capsule by mouth daily 19   Radames Bah MD   pyridoxine (B-6) 25 MG tablet Take 1 tablet (25 mg total) by mouth daily 19   Radames Bah MD       Allergies     Allergies   Allergen Reactions    Bactrim [Sulfamethoxazole-Trimethoprim]      Ineffective      Ketorolac Tromethamine      pain    Prednisone Other (See Comments)     aggression    Abilify [Aripiprazole] Palpitations       Objective   There were no vitals filed for this visit  There is no height or weight on file to calculate BMI  No intake or output data in the 24 hours ending 01/26/19 1632  Invasive Devices     Peripheral Intravenous Line            Peripheral IV 01/26/19 Right Forearm less than 1 day                Physical Exam  GENERAL: Appears well-developed and well-nourished  wincing in pain with panting breaths  HEENT: Normocephalic and atraumatic  No scleral icterus  PERRLA  EOMI B/L  No oropharyngeal edema  MM moist    NECK: Neck supple with no lymphadenopathy  Trachea midline  No JVD  CARDIOVASCULAR: S1 and S2 are present  Tachycardic and regular rhythm  No murmurs, rubs, or gallops  RESPIRATORY: CTA B/L, no rales, rhonci or wheezes  Diminished breath sounds and noticeably tachypneic   ABDOMINAL: Bowel sounds present in all 4 quadrants, abdomen distended and consistent with pregnancy of 19-20 weeks  No organomegaly, rebound, or guarding  EXTREMITIES: 2+ DP and PT pulses bilaterally; no cyanosis, clubbing, edema  ROM intact  EWING x4   MUSCULOSKELETAL: No joint tenderness, deformity or swelling, full range of motion without pain  NEUROLOGIC: Patient is alert and oriented to person, place, and time  No sensory or motor deficits  CN 2-12 intact  Plantars downgoing bilaterally  Speech fluent  SKIN: Skin is warm and dry  No skin lesions are present  No rashes  PSYCHIATRIC: Anxious mood and labile affect     Lab Results: I have personally reviewed pertinent reports      CBC:   Results from last 7 days  Lab Units 01/26/19  0845   WBC Thousand/uL 13 30*   RBC Million/uL 3 01*   HEMOGLOBIN g/dL 9 2*   HEMATOCRIT % 27 8*   MCV fL 92   MCH pg 30 6   MCHC g/dL 33 1   RDW % 13 9   MPV fL 9 3   PLATELETS Thousands/uL 266   NRBC AUTO /100 WBCs 0   NEUTROS PCT % 73   LYMPHS PCT % 21   MONOS PCT % 5   EOS PCT % 0   BASOS PCT % 0   NEUTROS ABS Thousands/µL 9 81*   LYMPHS ABS Thousands/µL 2 74   MONOS ABS Thousand/µL 0 62   EOS ABS Thousand/µL 0 05   , Chemistry Profile:   Results from last 7 days  Lab Units 01/26/19  0845   POTASSIUM mmol/L 3 9   CHLORIDE mmol/L 107   CO2 mmol/L 22   BUN mg/dL 6   CREATININE mg/dL 0 78   CALCIUM mg/dL 8 1*   AST U/L 17   ALT U/L 8*   ALK PHOS U/L 66   EGFR ml/min/1 73sq m 108   , Additional Labs:   Results from last 7 days  Lab Units 01/26/19  1115 01/26/19  0845   LIPASE u/L  --  76   LACTIC ACID mmol/L 1 7 2 1*   MAGNESIUM mg/dL  --  1 7   , iSTAT CHEM 8:   Results from last 7 days  Lab Units 01/26/19  0845   ANION GAP mmol/L 10   EGFR ml/min/1 73sq m 108   HEMOGLOBIN g/dL 9 2*       Imaging: I have personally reviewed pertinent films in PACS  No results found  Urinalysis:       Invalid input(s): URIBILINOGEN     Urine Micro:        EKG, Pathology, and Other Studies: I have personally reviewed pertinent reports  Medications given in Emergency Department       Assessment and Plan       1  Chest pain:  Patient with likely pneumonia based on chest x-ray and vital signs  Lactate normal   Flu negative  · Follow-up sputum culture and Gram stain, strep pneumonia and Legionella urine antigen, procalcitonin, follow-up blood cultures  · Continue IV fluids  · Continue IV ceftriaxone and azithromycin  · Respiratory protocol, aspiration precautions  · Albuterol p r n   · Tramadol p r n  Pain    2  Pregnancy:  Patient is 19-20 weeks pregnant and has not had any establish prenatal care  · Consult OB/GYN    3  Nicotine dependence:  Patient is still actively smoking during the pregnancy and smokes approximately 10 cigarettes a day  · Nicoderm patch    Code Status: Level 1 - Full Code  VTE Pharmacologic Prophylaxis: Enoxaparin (Lovenox)   VTE Mechanical Prophylaxis: sequential compression device  Admission Status: INPATIENT     Admission Time  I spent 30 minutes admitting the patient    This involved direct patient contact where I performed a full history and physical, reviewing previous records, and reviewing laboratory and other diagnostic studies      Je Moise MD  Internal Medicine  PGY-1

## 2019-01-26 NOTE — ED PROVIDER NOTES
History  Chief Complaint   Patient presents with    Chest Pain     19wk pregnant pt w/ c/o worsening CP and SOB over the past several days  Patient is a 81-year-old female with past medical and psychiatric history of asthma, skin cancer status post skin excision, depression, PTSD, intermittent explosive disorder, dissociative identity disorder, currently 19-20 weeks pregnant, presents to the emergency department complaining of severe left-sided chest pain and dyspnea  Patient reports 2 days ago she started with pain in her left shoulder that has since progressed to involve her left chest   She reports when she takes a deep breath the pain significantly worsens and feels as though her bones are breaking  She was seen at [facility] 2 days ago and at that time did have a CT scan which showed evidence of pneumonia  She was started on an antibiotic which she does not know the name of  She reports she was then seen at [facility] who did similar workup and started her on prednisone however her pain is significantly worse  She reports feeling short of breath as well  She also states that her whole entire body hurts  She does complain of some lower abdominal pain denies any vaginal bleeding, leakage of vaginal fluid  She reports having fever yesterday but denies fever today  She also reports productive cough for the past several days  On review of systems, she also admits to palpitations  She denies headache, dizziness or near syncope, URI symptoms, hemoptysis, nausea, vomiting, diarrhea, constipation, urinary symptoms, skin rash or color change, extremity swelling or pain, extremity weakness or paresthesia or other focal neurologic deficits  She has received no prenatal care thus far in this pregnancy  Last menstrual period 2018  She is *1 (*had placental abruption and fetal demise at 21 weeks)  She has been feeling the baby move consistently          History provided by:  Patient and parent   used: No    Chest Pain   Associated symptoms: abdominal pain, cough and shortness of breath    Associated symptoms: no back pain, no diaphoresis, no dizziness, no fever, no headache, no nausea, no numbness, no palpitations, not vomiting and no weakness        Prior to Admission Medications   Prescriptions Last Dose Informant Patient Reported? Taking? Prenatal Vit-Fe Fumarate-FA (PRENATAL COMPLETE) 14-0 4 MG TABS   No No   Sig: Take 1 capsule by mouth daily   albuterol (PROVENTIL HFA,VENTOLIN HFA) 90 mcg/act inhaler   Yes No   Sig: Inhale 2 puffs every 6 (six) hours as needed for wheezing   doxylamine (UNISON) 25 MG tablet   No No   Sig: Take 0 5 tablets (12 5 mg total) by mouth 2 (two) times a day as needed for sleep or nausea   pyridoxine (B-6) 25 MG tablet   No No   Sig: Take 1 tablet (25 mg total) by mouth daily      Facility-Administered Medications: None       Past Medical History:   Diagnosis Date    Anxiety     Asthma     Cancer ([facility] )     skin cancer    Depression     Dissociative identity disorder ([facility] )     Intermittent explosive disorder in adult     Migraine     PTSD (post-traumatic stress disorder)        Past Surgical History:   Procedure Laterality Date    ADENOIDECTOMY      CYST REMOVAL      nares    EAR FOREIGN BODY REMOVAL      SKIN CANCER EXCISION      TONSILLECTOMY         History reviewed  No pertinent family history  I have reviewed and agree with the history as documented  Social History   Substance Use Topics    Smoking status: Current Every Day Smoker     Packs/day: 0 50     Types: Cigarettes    Smokeless tobacco: Never Used    Alcohol use No        Review of Systems   Constitutional: Negative for chills, diaphoresis and fever  HENT: Negative for congestion, ear pain, rhinorrhea and sore throat  Eyes: Negative for pain and visual disturbance  Respiratory: Positive for cough and shortness of breath   Negative for chest tightness and wheezing  Cardiovascular: Positive for chest pain  Negative for palpitations  Gastrointestinal: Positive for abdominal pain  Negative for abdominal distention, blood in stool, constipation, diarrhea, nausea and vomiting  Genitourinary: Negative for dysuria, flank pain, frequency, hematuria, vaginal bleeding, vaginal discharge and vaginal pain  Musculoskeletal: Positive for myalgias  Negative for back pain and neck pain  Skin: Negative for color change, pallor and rash  Allergic/Immunologic: Negative for immunocompromised state  Neurological: Negative for dizziness, syncope, weakness, light-headedness, numbness and headaches  Hematological: Negative for adenopathy  Psychiatric/Behavioral: Negative for confusion and decreased concentration  All other systems reviewed and are negative  Physical Exam  Physical Exam   Constitutional: She is oriented to person, place, and time  She appears well-developed and well-nourished  She appears distressed  Patient is moaning and crying out in pain  There is an element of dramatization  Patient clutching left chest    HENT:   Head: Normocephalic and atraumatic  Right Ear: External ear normal    Left Ear: External ear normal    Mouth/Throat: Oropharynx is clear and moist  No oropharyngeal exudate  Eyes: Pupils are equal, round, and reactive to light  Conjunctivae and EOM are normal    Neck: Normal range of motion  Neck supple  No JVD present  Cardiovascular: Normal rate, regular rhythm, normal heart sounds and intact distal pulses  Exam reveals no gallop and no friction rub  No murmur heard  Pulmonary/Chest: Effort normal  No respiratory distress  She has no wheezes  She has no rales  She exhibits tenderness  Left chest wall tenderness  No crepitus  Decreased air movement throughout but no wheezing  Abdominal: Soft  Bowel sounds are normal  She exhibits no distension  There is no tenderness  There is no rebound and no guarding     No significant abdominal tenderness  Uterine fundus palpated just below the umbilicus  Musculoskeletal: Normal range of motion  She exhibits no edema or tenderness  Neurological: She is alert and oriented to person, place, and time  No gross motor or sensory deficits  Skin: Skin is warm and dry  No rash noted  She is not diaphoretic  No erythema  No pallor  Psychiatric: She has a normal mood and affect  Her behavior is normal    Nursing note and vitals reviewed        Vital Signs  ED Triage Vitals   Temperature Pulse Respirations Blood Pressure SpO2   01/26/19 0923 01/26/19 0811 01/26/19 0811 01/26/19 0811 01/26/19 0811   99 °F (37 2 °C) (!) 110 (!) 28 115/73 98 %      Temp Source Heart Rate Source Patient Position - Orthostatic VS BP Location FiO2 (%)   01/26/19 0811 01/26/19 0811 01/26/19 0811 01/26/19 0811 --   Oral Monitor Lying Left arm       Pain Score       01/26/19 0811       Worst Possible Pain         Vitals:    01/26/19 1000 01/26/19 1100 01/26/19 1400 01/26/19 1430   BP: 94/54 102/57 100/62 96/59   BP Location:       Pulse: (!) 116 97 91 93   Resp: 17 19 20 20   Temp:       TempSrc:       SpO2: 99% 96% 100% 100%   Weight:       Height:         Visual Acuity      ED Medications  Medications   lidocaine (LIDODERM) 5 % patch 1 patch (1 patch Topical Medication Applied 1/26/19 0846)   sodium chloride 0 9 % bolus 1,000 mL (0 mL Intravenous Stopped 1/26/19 0947)   HYDROmorphone (DILAUDID) injection 1 mg (1 mg Intravenous Given 1/26/19 0846)   albuterol inhalation solution 5 mg (5 mg Nebulization Given 1/26/19 0846)   ceftriaxone (ROCEPHIN) 1 g/50 mL in dextrose IVPB (0 mg Intravenous Stopped 1/26/19 1100)   azithromycin (ZITHROMAX) 500 mg in sodium chloride 0 9% 250mL IVPB 500 mg (0 mg Intravenous Stopped 1/26/19 1200)   ondansetron (ZOFRAN) injection 4 mg (4 mg Intravenous Given 1/26/19 1143)   acetaminophen (TYLENOL) tablet 650 mg (650 mg Oral Given 1/26/19 1146)   HYDROmorphone (DILAUDID) injection 0 5 mg (0 5 mg Intravenous Given 1/26/19 1514)       Diagnostic Studies  Results Reviewed     Procedure Component Value Units Date/Time    Lactic acid, plasma [[id number]]  (Normal) Collected:  01/26/19 1115    Lab Status:  Final result Specimen:  Blood from Arm, Right Updated:  01/26/19 1153     LACTIC ACID 1 7 mmol/L     Narrative:         Result may be elevated if tourniquet was used during collection  Rapid Influenza Screen with Reflex PCR [[id number]]  (Normal) Collected:  01/26/19 0923    Lab Status:  Final result Specimen:  Nasopharyngeal from Nasopharyngeal Swab Updated:  01/26/19 0947     Rapid Influenza A Ag Negative     Rapid Influenza B Ag Negative    INFLUENZA A/B AND RSV, PCR [[id number]] Collected:  01/26/19 0841    Lab Status: In process Specimen:  Nasopharyngeal from Nasopharyngeal Swab Updated:  01/26/19 0947    Lactic acid, plasma [[id number]]  (Abnormal) Collected:  01/26/19 0845    Lab Status:  Final result Specimen:  Blood from Arm, Right Updated:  01/26/19 0936     LACTIC ACID 2 1 (HH) mmol/L     Narrative:         Result may be elevated if tourniquet was used during collection  Basic metabolic panel [[id number]]  (Abnormal) Collected:  01/26/19 0845    Lab Status:  Final result Specimen:  Blood from Arm, Right Updated:  01/26/19 7798     Sodium 139 mmol/L      Potassium 3 9 mmol/L      Chloride 107 mmol/L      CO2 22 mmol/L      ANION GAP 10 mmol/L      BUN 6 mg/dL      Creatinine 0 78 mg/dL      Glucose 75 mg/dL      Calcium 8 1 (L) mg/dL      eGFR 108 ml/min/1 73sq m     Narrative:         National Kidney Disease Education Program recommendations are as follows:  GFR calculation is accurate only with a steady state creatinine  Chronic Kidney disease less than 60 ml/min/1 73 sq  meters  Kidney failure less than 15 ml/min/1 73 sq  meters      Hepatic function panel [[id number]]  (Abnormal) Collected:  01/26/19 0845    Lab Status:  Final result Specimen:  Blood from Arm, Right Updated: 01/26/19 0923     Total Bilirubin 0 20 mg/dL      Bilirubin, Direct 0 03 mg/dL      Alkaline Phosphatase 66 U/L      AST 17 U/L      ALT 8 (L) U/L      Total Protein 6 2 (L) g/dL      Albumin 2 2 (L) g/dL     Magnesium [[id number]]  (Normal) Collected:  01/26/19 0845    Lab Status:  Final result Specimen:  Blood from Arm, Right Updated:  01/26/19 0923     Magnesium 1 7 mg/dL     Lipase [[id number]]  (Normal) Collected:  01/26/19 0845    Lab Status:  Final result Specimen:  Blood from Arm, Right Updated:  01/26/19 0923     Lipase 76 u/L     B-type natriuretic peptide [[id number]]  (Abnormal) Collected:  01/26/19 0845    Lab Status:  Final result Specimen:  Blood from Arm, Right Updated:  01/26/19 0923     NT-proBNP 685 (H) pg/mL     D-Dimer [[id number]]  (Abnormal) Collected:  01/26/19 0845    Lab Status:  Final result Specimen:  Blood from Arm, Right Updated:  01/26/19 0922     D-Dimer, Quant 2,725 (H) ng/ml (FEU)     Troponin I [[id number]]  (Normal) Collected:  01/26/19 0845    Lab Status:  Final result Specimen:  Blood from Arm, Right Updated:  01/26/19 0913     Troponin I <0 02 ng/mL     Protime-INR [[id number]]  (Normal) Collected:  01/26/19 0845    Lab Status:  Final result Specimen:  Blood from Arm, Right Updated:  01/26/19 0902     Protime 13 5 seconds      INR 1 04    APTT [[id number]]  (Normal) Collected:  01/26/19 0845    Lab Status:  Final result Specimen:  Blood from Arm, Right Updated:  01/26/19 0902     PTT 27 seconds     CBC and differential [[id number]]  (Abnormal) Collected:  01/26/19 0845    Lab Status:  Final result Specimen:  Blood from Arm, Right Updated:  01/26/19 0852     WBC 13 30 (H) Thousand/uL      RBC 3 01 (L) Million/uL      Hemoglobin 9 2 (L) g/dL      Hematocrit 27 8 (L) %      MCV 92 fL      MCH 30 6 pg      MCHC 33 1 g/dL      RDW 13 9 %      MPV 9 3 fL      Platelets 204 Thousands/uL      nRBC 0 /100 WBCs      Neutrophils Relative 73 %      Immat GRANS % 1 %      Lymphocytes Relative 21 % Monocytes Relative 5 %      Eosinophils Relative 0 %      Basophils Relative 0 %      Neutrophils Absolute 9 81 (H) Thousands/µL      Immature Grans Absolute 0 07 Thousand/uL      Lymphocytes Absolute 2 74 Thousands/µL      Monocytes Absolute 0 62 Thousand/µL      Eosinophils Absolute 0 05 Thousand/µL      Basophils Absolute 0 01 Thousands/µL     Blood culture #1 [[id number]] Collected:  01/26/19 0845    Lab Status: In process Specimen:  Blood from Arm, Right Updated:  01/26/19 0849    Blood culture #2 [[id number]] Collected:  01/26/19 0845    Lab Status: In process Specimen:  Blood from Arm, Right Updated:  01/26/19 0849    UA w Reflex to Microscopic [[id number]]     Lab Status:  No result Specimen:  Urine                  XR chest 2 views   ED Interpretation by Philip Dunn DO (01/26 1510)   Left upper lobe and right lower lobe pneumonia  Procedures  ECG 12 Lead Documentation  Date/Time: 1/26/2019 8:42 AM  Performed by: Louann Meza by: Kwaku Fitzpatrick     ECG reviewed by me, the ED Provider: yes    Patient location:  ED  Previous ECG:     Previous ECG:  Unavailable  Rate:     ECG rate:  108    ECG rate assessment: tachycardic    Rhythm:     Rhythm: sinus tachycardia    Ectopy:     Ectopy: none    QRS:     QRS axis:  Normal    QRS intervals:  Normal  Conduction:     Conduction: normal    ST segments:     ST segments:  Normal  T waves:     T waves: normal      Pelvic Ultrasound  Performed by: Kwaku Fitzpatrick  Authorized by: Kwaku Fitzpatrick     Patient location:  Bedside and ED  Other Assisting Provider: No    Procedure details:     Indications: pregnant with abdominal pain      Assess:  Fetal viability    Technique:  Transabdominal obstetric (limited) exam  Uterine findings:     Intrauterine pregnancy: identified      Single gestation: identified      Fetal pole present: Grossly normal fetus        Fetal heart rate: identified      Fetal heart rate (bpm):  167 Phone Contacts  ED Phone Contact    ED Course  ED Course as of Jan 26 1532   Sat Jan 26, 2019   8415 According to medical records, patient was last seen here on 1/4/2019 for nausea, vomiting, lower abdominal pain and shortness of breath  She had unremarkable lab work and a bedside pelvic ultrasound which confirmed IUP with fetal heart rate of 145 bpm      0916 Troponin I: <0 02   0937 Bedside transabdominal pelvic ultrasound shows normal intrauterine pregnancy, grossly normal fetus with positive fetal movement and heartbeat of 167 bpm   Unable to save or print images from [address] [facility] [address] due to problem with printer  0206 Although D-dimer is elevated, I was able to obtain faxed outside records from [facility]   She was seen there on 01/24  According to the [facility] records, they were able to obtain records from [facility] which she was that 1 day prior  They noted that she had a CTA of the chest at that time which did not show an acute PE  There was evidence of multifocal pneumonia including the left upper lobe and right middle lobe  At this time, I do not recommend repeat CT scan and will obtain chest x-ray  Will most likely admit patient for failure of outpatient treatment, uncontrolled pain  Given that patient is 20 weeks pregnant, will recommend transfer to [address] from [facility] ER visit and there was a CTA of the chest performed which showed bilateral pneumonia and no PE  Of note, it was documented that patient persistently requested narcotic pain medication  [address] with transfer Center and patient accepted to the SOD service  Initial Sepsis Screening     Row Name 01/26/19 1000                Is the patient's history suggestive of a new or worsening infection?  (!)  Yes (Proceed)  -MA        Suspected source of infection pneumonia  -MA        Are two or more of the following signs & symptoms of infection both present and new to the patient? (!)  Yes (Proceed)  -MA        Indicate SIRS criteria Tachycardia > 90 bpm;Leukocytosis (WBC > 27379 IJL)  -MA        If the answer is yes to both questions, suspicion of sepsis is present          If severe sepsis is present AND tissue hypoperfusion perists in the hour after fluid resuscitation or lactate > 4, the patient meets criteria for SEPTIC SHOCK          Are any of the following organ dysfunction criteria present within 6 hours of suspected infection and SIRS criteria that are NOT considered to be chronic conditions? (!)  Yes  -MA        Organ dysfunction Lactate > 2 0 mmol/L  -MA        Date of presentation of severe sepsis 01/26/19  -MA        Time of presentation of severe sepsis 1126  -MA        Tissue hypoperfusion persists in the hour after crystalloid fluid administration, evidenced, by either:          Was hypotension present within one hour of the conclusion of crystalloid fluid administration? No  -MA        Date of presentation of septic shock          Time of presentation of septic shock            User Key  (r) = Recorded By, (t) = Taken By, (c) = Cosigned By    Initials Name Provider Type    ELLIE Amaya DO Physician           Default Flowsheet Data (last 720 hours)      Sepsis Reassess     Row Name 01/26/19 1126                   Repeat Volume Status and Tissue Perfusion Assessment Performed    Repeat Volume Status and Tissue Perfusion Assessment Performed Yes  -MA           Volume Status and Tissue Perfusion Post Fluid Resuscitation * Must Document All *    Vital Signs Reviewed (HR, RR, BP, T) Yes  -MA        Shock Index Reviewed Yes  -MA        Arterial Oxygen Saturation Reviewed (POx, SaO2 or SpO2) Yes (comment %)  -MA        Cardio Normal S1/S2; Regular rate and rhythm  -MA        Pulmonary Normal effort;Clear to auscultation  -MA        Capillary Refill Brisk  -MA        Peripheral Pulses Radial;Dorsalis Pedis  -MA        Peripheral Pulse +2  -MA        Dorsalis Pedis +2  -MA        Skin Warm;Dry  -MA        Urine output assessed Adequate  -MA           *OR*   Intensive Monitoring- Must Document One of the Following Four *:    Vital Signs Reviewed          * Central Venous Pressure (CVP or RAP)          * Central Venous Oxygen (SVO2, ScvO2 or Oxygen saturation via central catheter)          * Bedside Cardiovascular US in IVC diameter and % collapse          * Passive Leg Raise OR Crystalloid Challenge            User Key  (r) = Recorded By, (t) = Taken By, (c) = Cosigned By    Initials Name Provider Type    ELLIE Bowles DO Physician                MDM  Number of Diagnoses or Management Options  Diagnosis management comments: 81-year-old female currently 20 weeks pregnant presents with severe left-sided chest pain associated with productive cough and dyspnea  She has been seen by 2 other emergency department thus far for the pain and was told she had pneumonia and started on antibiotics per patient  She reports having a CT scan but it is unclear if it had IV contrast to definitively rule out acute PE  Patient does have significant psychiatric history and there is an element of traumatic however she does seem to be in severe pain  She states she has been taking Tylenol without relief  Explained to patient risk of narcotic medication in pregnancy however given her degree of pain, I feel benefit outweighs potential risk especially given this will likely be a 1 or 2 times dose  Will do cardiac workup with troponin, BNP, D-dimer  Will hold off on imaging until D-dimer result  Will attempt to get medical records from [facility] in [facility]   Unable to visualize records via [address] [place] [address] Rd  Will also give Lidoderm patch         Amount and/or Complexity of Data Reviewed  Clinical lab tests: ordered and reviewed  Tests in the radiology section of CPT®: reviewed and ordered  Tests in the medicine section of CPT®: ordered and reviewed  Decide to obtain previous medical records or to obtain history from someone other than the patient: yes  Review and summarize past medical records: yes  Independent visualization of images, tracings, or specimens: yes      CritCare Time    Disposition  Final diagnoses:   Bilateral pneumonia   Left sided chest pain   Dyspnea   Second trimester pregnancy     Time reflects when diagnosis was documented in both MDM as applicable and the Disposition within this note     Time User Action Codes Description Comment    1/26/2019 11:25 AM Benjiman Cools E Add [J18 9] Bilateral pneumonia     1/26/2019 11:26 AM Benjiman Cools E Add [R07 9] Left sided chest pain     1/26/2019 11:26 AM Benjiman Cools E Add [R06 00] Dyspnea     1/26/2019  3:10 PM Bere Furnish Add [Z34 92] Second trimester pregnancy       ED Disposition     ED Disposition Condition Comment    Transfer to Another [address] [place] [address] should be transferred out to [facility] under SOD service, Dr Alden Momin MD Documentation      Most Recent Value   Patient Condition  The patient has been stabilized such that within reasonable medical probability, no material deterioration of the patient condition or the condition of the unborn child(wojciech) is likely to result from the transfer   Reason for Transfer  Level of Care needed not available at this facility   Benefits of Transfer  Specialized equipment and/or services available at the receiving facility (Include comment)________________________ [Ob/Gyn service]   Risks of Transfer  Potential for delay in receiving treatment, Potential deterioration of medical condition, Loss of IV, Increased discomfort during transfer, Possible worsening of condition or death during transfer   Accepting Physician  Dr Anitha De La Torre Name, Mickey ALARCON  91    Sending MD Dr [facility]   Provider Certification  General risk, such as traffic hazards, adverse weather conditions, rough terrain or turbulence, possible failure of equipment (including vehicle or aircraft), or consequences of actions of persons outside the control of the transport personnel      RN Documentation      [facility] Name, [address]   SLB      Follow-up Information    None         Patient's Medications   Discharge Prescriptions    No medications on file     No discharge procedures on file      ED Provider  Electronically Signed by           Casey Kee,   01/26/19 3632 Ernesto Bowman, DO  01/26/19 1167

## 2019-01-26 NOTE — PLAN OF CARE
INFECTION - ADULT     Absence or prevention of progression during hospitalization Not Progressing     Absence of fever/infection during neutropenic period Not Progressing        PAIN - ADULT     Verbalizes/displays adequate comfort level or baseline comfort level Not Progressing

## 2019-01-26 NOTE — PROGRESS NOTES
[facility] Senior Admission Note   Unit/Bed # @DBLINK (RXG,66712)@ Encounter: [id number]  SOD Team A          Hailey Licea 26 y o  female [id number]       Patient seen and examined   Reviewed H&P per Dr Heather Baird with the assessment and plan    Assessment/Plan: Principal Problem:    Pneumonia  Active Problems:    Asthma    Anxiety    Multiple personality disorder ([facility] [address] [facility] )    Smoker         Disposition:  INPATIENT     Expected LOS: Stepan Levine MD

## 2019-01-27 LAB
ANION GAP SERPL CALCULATED.3IONS-SCNC: 6 MMOL/L (ref 4–13)
ATRIAL RATE: 108 BPM
BUN SERPL-MCNC: 5 MG/DL (ref 5–25)
CALCIUM SERPL-MCNC: 7.5 MG/DL (ref 8.3–10.1)
CHLORIDE SERPL-SCNC: 109 MMOL/L (ref 100–108)
CO2 SERPL-SCNC: 20 MMOL/L (ref 21–32)
CREAT SERPL-MCNC: 0.52 MG/DL (ref 0.6–1.3)
ERYTHROCYTE [DISTWIDTH] IN BLOOD BY AUTOMATED COUNT: 14.3 % (ref 11.6–15.1)
GFR SERPL CREATININE-BSD FRML MDRD: 136 ML/MIN/1.73SQ M
GLUCOSE SERPL-MCNC: 69 MG/DL (ref 65–140)
HCT VFR BLD AUTO: 25.9 % (ref 34.8–46.1)
HGB BLD-MCNC: 8.4 G/DL (ref 11.5–15.4)
MCH RBC QN AUTO: 30.7 PG (ref 26.8–34.3)
MCHC RBC AUTO-ENTMCNC: 32.4 G/DL (ref 31.4–37.4)
MCV RBC AUTO: 95 FL (ref 82–98)
P AXIS: 62 DEGREES
PLATELET # BLD AUTO: 233 THOUSANDS/UL (ref 149–390)
PMV BLD AUTO: 9.2 FL (ref 8.9–12.7)
POTASSIUM SERPL-SCNC: 3.6 MMOL/L (ref 3.5–5.3)
PR INTERVAL: 114 MS
QRS AXIS: 86 DEGREES
QRSD INTERVAL: 74 MS
QT INTERVAL: 316 MS
QTC INTERVAL: 423 MS
RBC # BLD AUTO: 2.74 MILLION/UL (ref 3.81–5.12)
SODIUM SERPL-SCNC: 135 MMOL/L (ref 136–145)
T WAVE AXIS: 59 DEGREES
VENTRICULAR RATE: 108 BPM
WBC # BLD AUTO: 8.42 THOUSAND/UL (ref 4.31–10.16)

## 2019-01-27 PROCEDURE — 87205 SMEAR GRAM STAIN: CPT | Performed by: INTERNAL MEDICINE

## 2019-01-27 PROCEDURE — 99232 SBSQ HOSP IP/OBS MODERATE 35: CPT | Performed by: INTERNAL MEDICINE

## 2019-01-27 PROCEDURE — 93010 ELECTROCARDIOGRAM REPORT: CPT | Performed by: INTERNAL MEDICINE

## 2019-01-27 PROCEDURE — 87070 CULTURE OTHR SPECIMN AEROBIC: CPT | Performed by: INTERNAL MEDICINE

## 2019-01-27 PROCEDURE — 85027 COMPLETE CBC AUTOMATED: CPT | Performed by: INTERNAL MEDICINE

## 2019-01-27 PROCEDURE — 99252 IP/OBS CONSLTJ NEW/EST SF 35: CPT | Performed by: OBSTETRICS & GYNECOLOGY

## 2019-01-27 PROCEDURE — 80048 BASIC METABOLIC PNL TOTAL CA: CPT | Performed by: INTERNAL MEDICINE

## 2019-01-27 RX ADMIN — SODIUM CHLORIDE 125 ML/HR: 0.9 INJECTION, SOLUTION INTRAVENOUS at 06:41

## 2019-01-27 RX ADMIN — ACETAMINOPHEN 650 MG: 325 TABLET, FILM COATED ORAL at 02:58

## 2019-01-27 RX ADMIN — ACETAMINOPHEN 650 MG: 325 TABLET, FILM COATED ORAL at 18:22

## 2019-01-27 RX ADMIN — NICOTINE 1 PATCH: 14 PATCH TRANSDERMAL at 09:56

## 2019-01-27 RX ADMIN — FERROUS SULFATE TAB 325 MG (65 MG ELEMENTAL FE) 325 MG: 325 (65 FE) TAB at 18:22

## 2019-01-27 RX ADMIN — TRAMADOL HYDROCHLORIDE 50 MG: 50 TABLET, COATED ORAL at 01:25

## 2019-01-27 RX ADMIN — SODIUM CHLORIDE 125 ML/HR: 0.9 INJECTION, SOLUTION INTRAVENOUS at 22:20

## 2019-01-27 RX ADMIN — Medication 1 TABLET: at 09:55

## 2019-01-27 RX ADMIN — FLUTICASONE PROPIONATE 1 PUFF: 220 AEROSOL, METERED RESPIRATORY (INHALATION) at 09:58

## 2019-01-27 RX ADMIN — FERROUS SULFATE TAB 325 MG (65 MG ELEMENTAL FE) 325 MG: 325 (65 FE) TAB at 12:35

## 2019-01-27 RX ADMIN — SODIUM CHLORIDE 125 ML/HR: 0.9 INJECTION, SOLUTION INTRAVENOUS at 15:16

## 2019-01-27 RX ADMIN — ALBUTEROL SULFATE 2 PUFF: 90 AEROSOL, METERED RESPIRATORY (INHALATION) at 18:19

## 2019-01-27 RX ADMIN — TRAMADOL HYDROCHLORIDE 50 MG: 50 TABLET, COATED ORAL at 09:55

## 2019-01-27 RX ADMIN — ALBUTEROL SULFATE 2 PUFF: 90 AEROSOL, METERED RESPIRATORY (INHALATION) at 05:58

## 2019-01-27 RX ADMIN — FLUTICASONE PROPIONATE 1 PUFF: 220 AEROSOL, METERED RESPIRATORY (INHALATION) at 22:19

## 2019-01-27 RX ADMIN — CEFTRIAXONE SODIUM 1000 MG: 10 INJECTION, POWDER, FOR SOLUTION INTRAVENOUS at 09:58

## 2019-01-27 RX ADMIN — AZITHROMYCIN MONOHYDRATE 500 MG: 500 INJECTION, POWDER, LYOPHILIZED, FOR SOLUTION INTRAVENOUS at 10:45
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
## 2019-01-27 NOTE — PROGRESS NOTES
Progress Note - Delfina Wren 26 y o  female MRN: [id number]    [facility] Encounter: [id number]    SOD A, Hospital Days - 1     Assessment/Plan:  1) Community acquired pneumonia - Patient with likely pneumonia based on chest x-ray and vital signs  Lactate normal  PCT elevated to 54 on admission   - Flu negative   - Strep pneumo and Legionella antigen negative   - Follow-up sputum culture and Gram stain   - Follow-up blood cultures   - Continue IV fluids   - Continue IV ceftriaxone and azithromycin   - Respiratory protocol, aspiration precautions   - Albuterol p r n    - Tylenol PRN for pleuritic chest pain, avoid opioids and tramadol in pregnancy     2) Pregnancy - Patient is 19-20 weeks pregnant and has not had any establish prenatal care  - Appreciate OB/GYN recommendations - daily fetal tones and multivites, no opioids for pain   - Flu shot prior to d/c     3) Nicotine dependence - Patient is still actively smoking during the pregnancy and smokes approximately 10 cigarettes a day   - Nicoderm patch    4) History of depression, PTSD - Patient with multiple psychiatric diagnoses, currently stable not on medications   - Outpatient psych follow-up    Dispo - Continue inpatient for #1    Subjective:   Patient resting comfortably in bed, chest pain and shortness of breath unchanged, cough improved  Denies fevers, chills, night sweats, weight loss, fatigue, head aches, dizziness, palpitations, abdominal pain, N/V, diarrhea, blood in the stool, hematuria, dysuria, lower extremity swelling  Objective:     Vitals: Blood pressure 100/61, pulse 99, temperature 98 4 °F (36 9 °C), resp  rate 18, height 5' 11" (1 803 m), weight 83 5 kg (184 lb 1 4 oz), last menstrual period 09/06/2018, SpO2 98 %, not currently breastfeeding  ,Body mass index is 25 67 kg/m²        Intake/Output Summary (Last 24 hours) at 01/27/19 1005  Last data filed at 01/27/19 0957   Gross per 24 hour   Intake          3897 92 ml Output              800 ml   Net          3097 92 ml     General Appearance:    Alert, cooperative, no distress, appears stated age  Head:    Normocephalic, without obvious abnormality, atraumatic  Neck:   Supple, symmetrical, trachea midline, no adenopathy  Lungs:     Diminished breath sounds throughout, likely secondary to effort, mild rales and rhonchi bilaterally  Heart:    Regular rate and rhythm, S1 and S2 normal, no murmur, rub   or gallop  Abdomen:     Soft, non-tender, bowel sounds active all four quadrants,  no masses, no organomegaly  Skin:   Skin color, texture, turgor normal, no rashes or lesions      Invasive Devices     Peripheral Intravenous Line            Peripheral IV 01/26/19 Right Forearm 1 day                Lab, Imaging and other studies: I have personally reviewed pertinent reports  Lab Results   Component Value Date    WBC 8 42 01/27/2019    HGB 8 4 (L) 01/27/2019    HCT 25 9 (L) 01/27/2019    MCV 95 01/27/2019     01/27/2019     Lab Results   Component Value Date    K 3 6 01/27/2019     (H) 01/27/2019    CO2 20 (L) 01/27/2019    BUN 5 01/27/2019    CREATININE 0 52 (L) 01/27/2019    CALCIUM 7 5 (L) 01/27/2019    AST 17 01/26/2019    ALT 8 (L) 01/26/2019    ALKPHOS 66 01/26/2019    EGFR 136 01/27/2019     No results found      VTE Pharmacologic Prophylaxis: Enoxaparin (Lovenox)  VTE Mechanical Prophylaxis: sequential compression device
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
## 2019-01-28 LAB
ANION GAP SERPL CALCULATED.3IONS-SCNC: 5 MMOL/L (ref 4–13)
BUN SERPL-MCNC: 3 MG/DL (ref 5–25)
CALCIUM SERPL-MCNC: 7.6 MG/DL (ref 8.3–10.1)
CHLORIDE SERPL-SCNC: 110 MMOL/L (ref 100–108)
CO2 SERPL-SCNC: 23 MMOL/L (ref 21–32)
CREAT SERPL-MCNC: 0.45 MG/DL (ref 0.6–1.3)
ERYTHROCYTE [DISTWIDTH] IN BLOOD BY AUTOMATED COUNT: 14 % (ref 11.6–15.1)
GFR SERPL CREATININE-BSD FRML MDRD: 143 ML/MIN/1.73SQ M
GLUCOSE SERPL-MCNC: 63 MG/DL (ref 65–140)
HCT VFR BLD AUTO: 25.3 % (ref 34.8–46.1)
HGB BLD-MCNC: 8.2 G/DL (ref 11.5–15.4)
HIV 1+2 AB+HIV1 P24 AG SERPL QL IA: NORMAL
HIV1 P24 AG SER QL: NORMAL
MCH RBC QN AUTO: 30.7 PG (ref 26.8–34.3)
MCHC RBC AUTO-ENTMCNC: 32.4 G/DL (ref 31.4–37.4)
MCV RBC AUTO: 95 FL (ref 82–98)
PLATELET # BLD AUTO: 208 THOUSANDS/UL (ref 149–390)
PMV BLD AUTO: 9.1 FL (ref 8.9–12.7)
POTASSIUM SERPL-SCNC: 3.6 MMOL/L (ref 3.5–5.3)
RBC # BLD AUTO: 2.67 MILLION/UL (ref 3.81–5.12)
SODIUM SERPL-SCNC: 138 MMOL/L (ref 136–145)
WBC # BLD AUTO: 5.77 THOUSAND/UL (ref 4.31–10.16)

## 2019-01-28 PROCEDURE — 85027 COMPLETE CBC AUTOMATED: CPT | Performed by: INTERNAL MEDICINE

## 2019-01-28 PROCEDURE — 99232 SBSQ HOSP IP/OBS MODERATE 35: CPT | Performed by: HOSPITALIST

## 2019-01-28 PROCEDURE — 80048 BASIC METABOLIC PNL TOTAL CA: CPT | Performed by: INTERNAL MEDICINE

## 2019-01-28 PROCEDURE — 87806 HIV AG W/HIV1&2 ANTB W/OPTIC: CPT | Performed by: INTERNAL MEDICINE

## 2019-01-28 RX ORDER — AZITHROMYCIN 250 MG/1
500 TABLET, FILM COATED ORAL EVERY 24 HOURS
Status: DISCONTINUED | OUTPATIENT
Start: 2019-01-28 | End: 2019-01-29 | Stop reason: HOSPADM

## 2019-01-28 RX ORDER — DOCUSATE SODIUM 100 MG/1
100 CAPSULE, LIQUID FILLED ORAL DAILY PRN
Status: DISCONTINUED | OUTPATIENT
Start: 2019-01-28 | End: 2019-01-29 | Stop reason: HOSPADM

## 2019-01-28 RX ADMIN — SODIUM CHLORIDE 125 ML/HR: 0.9 INJECTION, SOLUTION INTRAVENOUS at 22:47

## 2019-01-28 RX ADMIN — SODIUM CHLORIDE 125 ML/HR: 0.9 INJECTION, SOLUTION INTRAVENOUS at 06:02

## 2019-01-28 RX ADMIN — FERROUS SULFATE TAB 325 MG (65 MG ELEMENTAL FE) 325 MG: 325 (65 FE) TAB at 10:40

## 2019-01-28 RX ADMIN — ACETAMINOPHEN 650 MG: 325 TABLET, FILM COATED ORAL at 17:59

## 2019-01-28 RX ADMIN — FLUTICASONE PROPIONATE 1 PUFF: 220 AEROSOL, METERED RESPIRATORY (INHALATION) at 22:32

## 2019-01-28 RX ADMIN — FERROUS SULFATE TAB 325 MG (65 MG ELEMENTAL FE) 325 MG: 325 (65 FE) TAB at 17:59

## 2019-01-28 RX ADMIN — SODIUM CHLORIDE 125 ML/HR: 0.9 INJECTION, SOLUTION INTRAVENOUS at 23:44

## 2019-01-28 RX ADMIN — ACETAMINOPHEN 650 MG: 325 TABLET, FILM COATED ORAL at 09:58

## 2019-01-28 RX ADMIN — SODIUM CHLORIDE 125 ML/HR: 0.9 INJECTION, SOLUTION INTRAVENOUS at 14:12

## 2019-01-28 RX ADMIN — ACETAMINOPHEN 650 MG: 325 TABLET, FILM COATED ORAL at 03:26

## 2019-01-28 RX ADMIN — ALBUTEROL SULFATE 2 PUFF: 90 AEROSOL, METERED RESPIRATORY (INHALATION) at 14:17

## 2019-01-28 RX ADMIN — DOCUSATE SODIUM 100 MG: 100 CAPSULE, LIQUID FILLED ORAL at 13:16

## 2019-01-28 RX ADMIN — FAMOTIDINE 20 MG: 20 TABLET ORAL at 22:32

## 2019-01-28 RX ADMIN — AZITHROMYCIN 500 MG: 250 TABLET, FILM COATED ORAL at 10:40

## 2019-01-28 RX ADMIN — CEFTRIAXONE SODIUM 1000 MG: 10 INJECTION, POWDER, FOR SOLUTION INTRAVENOUS at 09:58

## 2019-01-28 RX ADMIN — FLUTICASONE PROPIONATE 1 PUFF: 220 AEROSOL, METERED RESPIRATORY (INHALATION) at 08:55

## 2019-01-28 RX ADMIN — Medication 1 TABLET: at 08:55

## 2019-01-28 RX ADMIN — NICOTINE 1 PATCH: 14 PATCH TRANSDERMAL at 08:56

## 2019-01-28 NOTE — SOCIAL WORK
Met with patient and sig other Clare Rajani and explained CM program and CM role  Resides with father in a house, rampt to access home, bed/bathroom 1st floor  Independent prior to admission for ADL's and ambulation  PCP - has appt next week, MD in same office building as PCP, does not remember name  She does not drive  Denies [address], DME, IP Rehab utilization  History IP psyche admission for depression after sons death  Denies drug and/or alcohol abuse  Primary contact sig other Cassandra Gunter, [phone]  No POA  SO Cassandra Gunter will drive home    CM reviewed d/c planning process including the following: identifying help at home, patient preference for d/c planning needs, Discharge Lounge, [facility] Meds to Bed program, availability of treatment team to discuss questions or concerns patient and/or family may have regarding understanding medications and recognizing signs and symptoms once discharged  CM also encouraged patient to follow up with all recommended appointments after discharge  Patient advised of importance for patient and family to participate in managing patients medical well being  Patient/caregiver received discharge checklist  Content reviewed  Patient/caregiver encouraged to participate in discharge plan of care prior to discharge home

## 2019-01-28 NOTE — PROGRESS NOTES
Interval progress note:  Reassess patient around 230 p m , she stated that her abdominal pain was better  She was evaluated by Ob as well and her abdominal pain was felt to not be related to  contractions or labor, most likely the functional abdominal pain related to constipation  Orders for bowel regimen medications were placed

## 2019-01-28 NOTE — PROGRESS NOTES
Progress Note - Kailyn Hernandez 26 y o  female MRN: [id number]    Unit/Bed#: [facility] 914-01 Encounter: [id number]    SOD A, Hospital Days - 2     Assessment/Plan:  1) Community acquired pneumonia - Patient with likely pneumonia based on chest x-ray and vital signs  Lactate normal  PCT elevated to 54 on admission              - Flu negative              - Strep pneumo and Legionella antigen negative              - Follow-up sputum culture and Gram stain              - Follow-up blood cultures, NG at 24h              - Continue IV fluids              - Continue IV ceftriaxone and PO azithromycin, will deescalate based on sputum cultures, likely to PO cephalosporin and azithromycin              - Respiratory protocol, aspiration precautions              - Albuterol p r n               - Tylenol PRN for pleuritic chest pain, avoid opioids and tramadol in pregnancy     2) Pregnancy - Patient is 19-20 weeks pregnant and has not had any establish prenatal care  - Appreciate OB/GYN recommendations - daily fetal tones and multivites, no opioids for pain              - Flu shot prior to d/c   - Patient complaining of new onset lower abdominal pain 7/10, sharp, lasting 30 seconds and occurring every 10 minutes this morning, OB paged regarding this     3) Nicotine dependence - Patient is still actively smoking during the pregnancy and smokes approximately 10 cigarettes a day              - Nicoderm patch     4) History of depression, PTSD - Patient with multiple psychiatric diagnoses, currently stable not on medications              - Outpatient psych follow-up strongly recommended     Dispo - Continue inpatient for #1, can deescalate to PO abx depending on cultures    Subjective:   Patient's chest pain has improved, cough has not stable  However new sharp a 7/10 non radiating lower abdominal pain that lasts for about 30 sec and comes and goes every 10 min started around 8:00 a m  This morning    OB was paged regarding this, otherwise denies fevers, chills, chest pain, palpitations, N/V, diarrhea, blood in the stool, hematuria, dysuria, lower extremity swelling  Objective:     Vitals: Blood pressure 105/65, pulse 90, temperature 98 06 °F (36 7 °C), resp  rate 20, height 5' 11" (1 803 m), weight 83 5 kg (184 lb 1 4 oz), last menstrual period 09/06/2018, SpO2 100 %, not currently breastfeeding  ,Body mass index is 25 67 kg/m²        Intake/Output Summary (Last 24 hours) at 01/28/19 7158  Last data filed at 01/28/19 0602   Gross per 24 hour   Intake          4193 76 ml   Output             2500 ml   Net          1693 76 ml     General Appearance:    Alert, cooperative, no distress, appears stated age  Head:    Normocephalic, without obvious abnormality, atraumatic  Neck:   Supple, symmetrical, trachea midline, no adenopathy  Lungs:     Diminished breath sounds throughout, likely secondary to effort, mild rales and rhonchi bilaterally  Heart:    Regular rate and rhythm, S1 and S2 normal, no murmur, rub   or gallop  Abdomen:     Soft, non-tender, bowel sounds active all four quadrants,  no masses, no organomegaly  Skin:   Skin color, texture, turgor normal, no rashes or lesions     Invasive Devices     Peripheral Intravenous Line            Peripheral IV 01/26/19 Right Forearm 1 day                Lab, Imaging and other studies: I have personally reviewed pertinent films in PACS    Lab Results   Component Value Date    WBC 8 42 01/27/2019    HGB 8 4 (L) 01/27/2019    HCT 25 9 (L) 01/27/2019    MCV 95 01/27/2019     01/27/2019     Lab Results   Component Value Date    K 3 6 01/27/2019     (H) 01/27/2019    CO2 20 (L) 01/27/2019    BUN 5 01/27/2019    CREATININE 0 52 (L) 01/27/2019    CALCIUM 7 5 (L) 01/27/2019    AST 17 01/26/2019    ALT 8 (L) 01/26/2019    ALKPHOS 66 01/26/2019    EGFR 136 01/27/2019     Xr Chest 2 Views    Result Date: 1/27/2019  Impression: Bilateral pulmonary consolidation suspicious for pneumonia   Workstation performed: [id number]     VTE Pharmacologic Prophylaxis: Enoxaparin (Lovenox)  VTE Mechanical Prophylaxis: sequential compression device

## 2019-01-28 NOTE — PROGRESS NOTES
Progress Note - OB  Haider Wagner 26 y o  female MRN: [id number]  Unit/Bed#: [facility] 914-01 Encounter: [id number]    Chief Complaint: abdominal pain     HPI: Patient is a  at 20w4d by LMP 18 who is admitted for left shoulder and chest pain due to pneumonia, now with abdominal cramping  Patient reports having a history of constipation and GERD  She feels like this pain is similar to gas bubbles  She denies any vaginal bleeding, leaking of fluid  Can feel baby occasionally move  She states the pain is better with the aqua k pad  The new sharp pain last for about 30 seconds, causing the patient to bend over in pain followed by resolution  She has her first appointment with [facility] at 19    Vitals:   BP 94/53   Pulse 89   Temp 98 2 °F (36 8 °C)   Resp 18   Ht 5' 11" (1 803 m)   Wt 83 5 kg (184 lb 1 4 oz)   LMP 2018   SpO2 100%   BMI 25 67 kg/m²   Body mass index is 25 67 kg/m²  Physical Exam  GEN: episodic discomfort where she bends over for 30 seconds followed by resolution   ABD: gravid, nontender  SSE:  visually closed on exam, no discharge seen on exam   Wet [place]/ARMINDA was negative for clue cells, trich, and hyphae    TVUS: breech presentation, cervical length of 4 94cm, no funneling or debris noted, positive cardiac activity, positive fetal movement     Labs:   Recent Results (from the past 24 hour(s))   Rapid HIV 1/2 AB-AG Combo    Collection Time: 19  5:30 AM   Result Value Ref Range    Rapid HIV 1 AND 2 Non-Reactive Non-Reactive    HIV-1 P24 Ag Screen Non-Reactive Non-Reactive   CBC and Platelet    Collection Time: 19  9:17 AM   Result Value Ref Range    WBC 5 77 4 31 - 10 16 Thousand/uL    RBC 2 67 (L) 3 81 - 5 12 Million/uL    Hemoglobin 8 2 (L) 11 5 - 15 4 g/dL    Hematocrit 25 3 (L) 34 8 - 46 1 %    MCV 95 82 - 98 fL    MCH 30 7 26 8 - 34 3 pg    MCHC 32 4 31 4 - 37 4 g/dL    RDW 14 0 11 6 - 15 1 %    Platelets 327 597 - 797 Thousands/uL    MPV 9 1 8 9 - 12 7 fL   Basic metabolic panel    Collection Time: 19  9:32 AM   Result Value Ref Range    Sodium 138 136 - 145 mmol/L    Potassium 3 6 3 5 - 5 3 mmol/L    Chloride 110 (H) 100 - 108 mmol/L    CO2 23 21 - 32 mmol/L    ANION GAP 5 4 - 13 mmol/L    BUN 3 (L) 5 - 25 mg/dL    Creatinine 0 45 (L) 0 60 - 1 30 mg/dL    Glucose 63 (L) 65 - 140 mg/dL    Calcium 7 6 (L) 8 3 - 10 1 mg/dL    eGFR 143 ml/min/1 73sq m       A/P: 25 yo  at 20w4d who was admitted for pneumonia now with new onset of abdominal pain, unlikely to be  labor   1) Cervical length was 4 94 - 5 11cm  No vaginal infections seen  2) Cardiac activity and fetal movement noted      Annita Ramos MD  2019  2:32 PM

## 2019-01-28 NOTE — PLAN OF CARE
INFECTION - ADULT     Absence of fever/infection during neutropenic period Completed          INFECTION - ADULT     Absence or prevention of progression during hospitalization Progressing        PAIN - ADULT     Verbalizes/displays adequate comfort level or baseline comfort level Progressing

## 2019-01-29 VITALS
SYSTOLIC BLOOD PRESSURE: 103 MMHG | HEART RATE: 90 BPM | RESPIRATION RATE: 18 BRPM | TEMPERATURE: 99.1 F | WEIGHT: 184.08 LBS | BODY MASS INDEX: 25.77 KG/M2 | OXYGEN SATURATION: 98 % | DIASTOLIC BLOOD PRESSURE: 60 MMHG | HEIGHT: 71 IN

## 2019-01-29 PROBLEM — Z34.90 PREGNANCY: Status: ACTIVE | Noted: 2019-01-29

## 2019-01-29 LAB
ANION GAP SERPL CALCULATED.3IONS-SCNC: 6 MMOL/L (ref 4–13)
BACTERIA SPT RESP CULT: NORMAL
BASOPHILS # BLD AUTO: 0.01 THOUSANDS/ΜL (ref 0–0.1)
BASOPHILS NFR BLD AUTO: 0 % (ref 0–1)
BUN SERPL-MCNC: 3 MG/DL (ref 5–25)
CALCIUM SERPL-MCNC: 7.8 MG/DL (ref 8.3–10.1)
CHLORIDE SERPL-SCNC: 110 MMOL/L (ref 100–108)
CO2 SERPL-SCNC: 21 MMOL/L (ref 21–32)
CREAT SERPL-MCNC: 0.49 MG/DL (ref 0.6–1.3)
EOSINOPHIL # BLD AUTO: 0.07 THOUSAND/ΜL (ref 0–0.61)
EOSINOPHIL NFR BLD AUTO: 1 % (ref 0–6)
ERYTHROCYTE [DISTWIDTH] IN BLOOD BY AUTOMATED COUNT: 13.8 % (ref 11.6–15.1)
GFR SERPL CREATININE-BSD FRML MDRD: 139 ML/MIN/1.73SQ M
GLUCOSE SERPL-MCNC: 69 MG/DL (ref 65–140)
GRAM STN SPEC: NORMAL
HCT VFR BLD AUTO: 24.9 % (ref 34.8–46.1)
HGB BLD-MCNC: 8.1 G/DL (ref 11.5–15.4)
IMM GRANULOCYTES # BLD AUTO: 0.07 THOUSAND/UL (ref 0–0.2)
IMM GRANULOCYTES NFR BLD AUTO: 1 % (ref 0–2)
LYMPHOCYTES # BLD AUTO: 1.57 THOUSANDS/ΜL (ref 0.6–4.47)
LYMPHOCYTES NFR BLD AUTO: 19 % (ref 14–44)
MCH RBC QN AUTO: 30.9 PG (ref 26.8–34.3)
MCHC RBC AUTO-ENTMCNC: 32.5 G/DL (ref 31.4–37.4)
MCV RBC AUTO: 95 FL (ref 82–98)
MONOCYTES # BLD AUTO: 0.52 THOUSAND/ΜL (ref 0.17–1.22)
MONOCYTES NFR BLD AUTO: 6 % (ref 4–12)
NEUTROPHILS # BLD AUTO: 6.18 THOUSANDS/ΜL (ref 1.85–7.62)
NEUTS SEG NFR BLD AUTO: 73 % (ref 43–75)
NRBC BLD AUTO-RTO: 0 /100 WBCS
PLATELET # BLD AUTO: 252 THOUSANDS/UL (ref 149–390)
PMV BLD AUTO: 9.1 FL (ref 8.9–12.7)
POTASSIUM SERPL-SCNC: 3.7 MMOL/L (ref 3.5–5.3)
PROCALCITONIN SERPL-MCNC: 21.9 NG/ML
RBC # BLD AUTO: 2.62 MILLION/UL (ref 3.81–5.12)
SODIUM SERPL-SCNC: 137 MMOL/L (ref 136–145)
WBC # BLD AUTO: 8.42 THOUSAND/UL (ref 4.31–10.16)

## 2019-01-29 PROCEDURE — 80048 BASIC METABOLIC PNL TOTAL CA: CPT | Performed by: INTERNAL MEDICINE

## 2019-01-29 PROCEDURE — 84145 PROCALCITONIN (PCT): CPT | Performed by: INTERNAL MEDICINE

## 2019-01-29 PROCEDURE — 99238 HOSP IP/OBS DSCHRG MGMT 30/<: CPT | Performed by: HOSPITALIST

## 2019-01-29 PROCEDURE — 85025 COMPLETE CBC W/AUTO DIFF WBC: CPT | Performed by: INTERNAL MEDICINE

## 2019-01-29 RX ORDER — NICOTINE 21 MG/24HR
1 PATCH, TRANSDERMAL 24 HOURS TRANSDERMAL DAILY
Qty: 28 PATCH | Refills: 0 | Status: SHIPPED | OUTPATIENT
Start: 2019-01-30 | End: 2019-05-17

## 2019-01-29 RX ORDER — FERROUS SULFATE 325(65) MG
325 TABLET ORAL
Refills: 0 | Status: SHIPPED | OUTPATIENT
Start: 2019-01-29 | End: 2019-05-17

## 2019-01-29 RX ORDER — FAMOTIDINE 20 MG
1 TABLET ORAL DAILY
Qty: 60 EACH | Refills: 0 | Status: SHIPPED | OUTPATIENT
Start: 2019-01-29

## 2019-01-29 RX ORDER — FLUTICASONE PROPIONATE 220 UG/1
1 AEROSOL, METERED RESPIRATORY (INHALATION) EVERY 12 HOURS SCHEDULED
Refills: 0 | Status: SHIPPED | OUTPATIENT
Start: 2019-01-29

## 2019-01-29 RX ORDER — AZITHROMYCIN 500 MG/1
500 TABLET, FILM COATED ORAL EVERY 24 HOURS
Qty: 4 TABLET | Refills: 0 | Status: SHIPPED | OUTPATIENT
Start: 2019-01-30 | End: 2019-02-03

## 2019-01-29 RX ORDER — DOCUSATE SODIUM 100 MG/1
100 CAPSULE, LIQUID FILLED ORAL DAILY PRN
Qty: 10 CAPSULE | Refills: 0 | Status: SHIPPED | OUTPATIENT
Start: 2019-01-29 | End: 2019-05-17

## 2019-01-29 RX ORDER — FAMOTIDINE 20 MG/1
20 TABLET, FILM COATED ORAL 2 TIMES DAILY PRN
Refills: 0 | Status: SHIPPED | OUTPATIENT
Start: 2019-01-29 | End: 2019-05-17

## 2019-01-29 RX ORDER — CEFUROXIME AXETIL 500 MG/1
500 TABLET ORAL EVERY 12 HOURS SCHEDULED
Qty: 8 TABLET | Refills: 0 | Status: SHIPPED | OUTPATIENT
Start: 2019-01-29 | End: 2019-02-02

## 2019-01-29 RX ADMIN — Medication 1 TABLET: at 10:14

## 2019-01-29 RX ADMIN — SODIUM CHLORIDE 125 ML/HR: 0.9 INJECTION, SOLUTION INTRAVENOUS at 06:38

## 2019-01-29 RX ADMIN — FLUTICASONE PROPIONATE 1 PUFF: 220 AEROSOL, METERED RESPIRATORY (INHALATION) at 10:14

## 2019-01-29 RX ADMIN — FERROUS SULFATE TAB 325 MG (65 MG ELEMENTAL FE) 325 MG: 325 (65 FE) TAB at 11:14

## 2019-01-29 RX ADMIN — NICOTINE 1 PATCH: 14 PATCH TRANSDERMAL at 10:17

## 2019-01-29 RX ADMIN — CEFTRIAXONE SODIUM 1000 MG: 10 INJECTION, POWDER, FOR SOLUTION INTRAVENOUS at 10:14

## 2019-01-29 RX ADMIN — AZITHROMYCIN 500 MG: 250 TABLET, FILM COATED ORAL at 11:14

## 2019-01-29 RX ADMIN — ACETAMINOPHEN 650 MG: 325 TABLET, FILM COATED ORAL at 06:36

## 2019-01-29 NOTE — DISCHARGE SUMMARY
IMR Discharge Summary - Medical Lucía Quiñonez 26 y o  female MRN: [id number]    [address] Encounter: [id number]    BRIEF OVERVIEW    Admitting Provider: David Liu MD  Discharge Provider: Margurite Mcburney, MD  Primary Care Physician at Discharge: Patient given instructions to follow up with women's health at [facility] and to establish PCP care    [address]  Admission Date: 1/26/2019     Discharge Date: 1/29/2019    Hospital Course  HPI per Dr Dimitry Alva note dated 1/26/2019 4:31PM:  "Lucía Quiñonez is a 26 y o  female  with past medical and psychiatric history of asthma, skin cancer status post skin excision, depression, PTSD, intermittent explosive disorder, dissociative identity disorder, currently 19-20 weeks pregnant, who presented to the [facility] emergency department complaining of severe left-sided chest pain and dyspnea  Patient reports 2 days ago she started with pain in her left shoulder that has since progressed to involve her left chest   She reports when she takes a deep breath the pain significantly worsens and feels as though her bones are breaking  She was seen at [facility] 2 days ago and at that time did have a CT scan which showed evidence of pneumonia  And was started on an antibiotic which she does not know  She reports she was then seen at [facility] who did similar workup and started her on prednisone however her pain is significantly worse  She reports feeling short of breath as well  She also states that her whole entire body hurts  She does complain of some lower abdominal pain denies any vaginal bleeding, leakage of vaginal fluid  She reports having fever yesterday but denies fever today  She also reports productive cough for the past several days  On review of systems, she also admits to palpitations    She denies headache, dizziness or near syncope, URI symptoms, hemoptysis, nausea, vomiting, diarrhea, constipation, urinary symptoms, skin rash or color change, extremity swelling or pain, extremity weakness or paresthesia or other focal neurologic deficits  She has received no prenatal care thus far in this pregnancy  Last menstrual period 2018  She is *1 (*had placental abruption and fetal demise at 21 weeks)  She has been feeling the baby move consistently "    The patient was treated with azithromycin and ceftriaxone for her community-acquired pneumonia  Shortness of breath and cough gradually improved over the next 2 days  She was also seen by Ob evaluated daily for fetal heart tones  She had episode of lower abdominal pain on  which was believed to just be from constipation or [place] contractions, she was evaluated and it was determined that her fetus was not a risk and she was not having  labor  This abdominal pain improved and she was discharged on 2019 with a total of 7 days of antibiotics  Her discharge antibiotics were Ceftin 500mg BID and azithromycin 500 mg daily  She was given instructions to keep her follow-up appointment with [facility] on  and to establish PCP care  Presenting Problem/History of Present Illness  Principal Problem:    Pneumonia  Active Problems:    Asthma    Anxiety    Multiple personality disorder ([facility] [address] )    Smoker    Pregnancy  Resolved Problems:    * No resolved hospital problems  *    1) Community acquired pneumonia - Patient with likely pneumonia based on chest x-ray and vital signs   Lactate normal  PCT elevated to 54 on admission              - Flu negative              - Strep pneumo and Legionella antigen negative              - Follow-up sputum culture and Gram stain              - Follow-up blood cultures, NG at 48h              - Continue IV fluids              - Continue IV ceftriaxone and PO azithromycin, will deescalate based on sputum cultures, likely to PO cephalosporin and azithromycin              - Respiratory protocol, aspiration precautions              - Albuterol p r n               - Tylenol PRN for pleuritic chest pain, avoid opioids and tramadol in pregnancy     2) Pregnancy - Patient is 19-20 weeks pregnant and has not had any establish prenatal care               - Appreciate OB/GYN recommendations - daily fetal tones and multivites, no opioids for pain              - Flu shot prior to d/c              - Patient complaining of new onset lower abdominal pain 7/10, sharp, lasting 30 seconds and occurring every 10 minutes on   OB evaluated: normal cervical length, no signs of infection, unlikley related to  labor     3) Nicotine dependence - Patient is still actively smoking during the pregnancy and smokes approximately 10 cigarettes a day              - Nicoderm patch     4) History of depression, PTSD - Patient with multiple psychiatric diagnoses, currently stable not on medications              - Outpatient psych follow-up strongly recommended     5) Constipation - Lower abdominal pain, likely functional pain 2/2 constipation and gas              - Colace 100mg PRN              - Can add Miralax and enema if unresolved    Diagnostic Procedures Performed  Imaging Studies:  Xr Chest 2 Views    Result Date: 2019  Impression: Bilateral pulmonary consolidation suspicious for pneumonia   Workstation performed: [id number]     Pertinent Labs:    Lab Results   Component Value Date    WBC 8 42 2019    HGB 8 1 (L) 2019    HCT 24 9 (L) 2019    MCV 95 2019     2019     Lab Results   Component Value Date    K 3 7 2019     (H) 2019    CO2 21 2019    BUN 3 (L) 2019    CREATININE 0 49 (L) 2019    CALCIUM 7 8 (L) 2019    AST 17 2019    ALT 8 (L) 2019    ALKPHOS 66 2019    EGFR 139 2019     Therapeutic Procedures Performed  None    Test Results Pending at Discharge: Blood cultures x2 NG at 72h    Medications     Medication List to be Continued at Discharge  Current Discharge Medication List      CONTINUE these medications which have NOT CHANGED    Details   albuterol (PROVENTIL HFA,VENTOLIN HFA) 90 mcg/act inhaler Inhale 2 puffs every 6 (six) hours as needed for wheezing      doxylamine (UNISON) 25 MG tablet Take 0 5 tablets (12 5 mg total) by mouth 2 (two) times a day as needed for sleep or nausea  Qty: 30 tablet, Refills: 0    Associated Diagnoses: Nausea and vomiting in pregnancy      pyridoxine (B-6) 25 MG tablet Take 1 tablet (25 mg total) by mouth daily  Qty: 30 tablet, Refills: 0    Associated Diagnoses: Nausea and vomiting in pregnancy           Current Discharge Medication List      START taking these medications    Details   azithromycin (ZITHROMAX) 500 MG tablet Take 1 tablet (500 mg total) by mouth every 24 hours for 4 days  Qty: 4 tablet, Refills: 0    Associated Diagnoses: Pneumonia of left lower lobe due to infectious organism (HCC)      cefuroxime (CEFTIN) 500 mg tablet Take 1 tablet (500 mg total) by mouth every 12 (twelve) hours for 4 days  Qty: 8 tablet, Refills: 0    Associated Diagnoses: Pneumonia of left lower lobe due to infectious organism (HCC)      docusate sodium (COLACE) 100 mg capsule Take 1 capsule (100 mg total) by mouth daily as needed for constipation  Qty: 10 capsule, Refills: 0    Associated Diagnoses: Abdominal pain affecting pregnancy      famotidine (PEPCID) 20 mg tablet Take 1 tablet (20 mg total) by mouth 2 (two) times a day as needed for heartburn  Refills: 0    Associated Diagnoses: Abdominal pain affecting pregnancy      ferrous sulfate 325 (65 Fe) mg tablet Take 1 tablet (325 mg total) by mouth 2 (two) times a day before lunch and dinner  Refills: 0    Associated Diagnoses: 20 weeks gestation of pregnancy      fluticasone (FLOVENT HFA) 220 mcg/act inhaler Inhale 1 puff every 12 (twelve) hours Rinse mouth after use   Refills: 0    Associated Diagnoses: Moderate asthma, unspecified whether complicated, unspecified whether persistent      miconazole (MONISTAT) 200 mg vaginal suppository Insert 1 suppository (200 mg total) into the vagina daily at bedtime for 3 doses  Qty: 3 suppository, Refills: 0    Associated Diagnoses: Abdominal pain affecting pregnancy      miconazole (MONISTAT) 200-2 mg-% (9 g) Apply topically 2 (two) times a day as needed (Irritation) for up to 6 days  Refills: 0    Associated Diagnoses: Abdominal pain affecting pregnancy      nicotine (NICODERM CQ) 14 mg/24hr TD 24 hr patch Place 1 patch on the skin daily  Qty: 28 patch, Refills: 0    Associated Diagnoses: Smoker           Current Discharge Medication List          Allergies  Allergies   Allergen Reactions    Reglan [Metoclopramide] Nausea Only     Patient also passes out    Ketorolac Tromethamine      pain    Prednisone Other (See Comments)     aggression    Abilify [Aripiprazole] Palpitations     Makes patient suicidal     Discharge Diet: regular diet  Activity restrictions: none  Discharge Condition: good  Discharged With Lines: no    Discharge Disposition: Home  Phone Number: [phone]    Outpatient Follow-Up  Patient given instructions establish PCP care and to keep her follow-up appointment with [facility] women[facility]      Code Status: Level 1 - Full Code    Discharge  Statement   I spent 30 minutes minutes discharging the patient  This time was spent on the day of discharge  I had direct contact with the patient on the day of discharge  Additional documentation is required if more than 30 minutes were spent on discharge

## 2019-01-29 NOTE — RESPIRATORY THERAPY NOTE
RT Protocol Note  Yunior Blackwood 26 y o  female MRN: [id number]  [facility] Encounter: [id number]    Assessment    Principal Problem:    Pneumonia  Active Problems:    Asthma    Anxiety    Multiple personality disorder ([place] [address] )    Smoker      Home Pulmonary Medications:  Albuterol prn       Past Medical History:   Diagnosis Date    Anxiety     Asthma     Cancer ([facility] )     skin cancer    Depression     Dissociative identity disorder ([place] [address] )     Intermittent explosive disorder in adult     Migraine     PTSD (post-traumatic stress disorder)      Social History     Social History    Marital status: Significant Other     Spouse name: N/A    Number of children: N/A    Years of education: N/A     Social History Main Topics    Smoking status: Current Every Day Smoker     Packs/day: 0 50     Types: Cigarettes    Smokeless tobacco: Never Used    Alcohol use No    Drug use: No    Sexual activity: Not on file     Other Topics Concern    Not on file     Social History Narrative    No narrative on file       Subjective         Objective    Physical Exam:   Assessment Type: Assess only  General Appearance: Awake  Respiratory Pattern: Normal  Chest Assessment: Chest expansion symmetrical  Bilateral Breath Sounds: Diminished    Vitals:  Blood pressure 103/60, pulse 90, temperature 99 1 °F (37 3 °C), resp  rate 18, height 5' 11" (1 803 m), weight 83 5 kg (184 lb 1 4 oz), last menstrual period 09/06/2018, SpO2 98 %, not currently breastfeeding  Imaging and other studies: I have personally reviewed pertinent reports  Plan    Respiratory Plan: Discontinue Protocol        Resp Comments: Pt admited with pneumonia and PMHx of asthma  Pt is in no resp distress at this time  Will continue with PRN albuterol HFA and will d/c resp protcol

## 2019-01-29 NOTE — DISCHARGE INSTRUCTIONS
Community Acquired Pneumonia   WHAT YOU NEED TO KNOW:   Community-acquired pneumonia (CAP) is a lung infection that you get outside of a hospital or nursing home setting  Your lungs become inflamed and cannot work well  CAP may be caused by bacteria, viruses, or fungi  DISCHARGE INSTRUCTIONS:   Return to the emergency department if:   · You are confused and cannot think clearly  · You have increased trouble breathing  · Your lips or fingernails turn gray or blue  Contact your healthcare provider if:   · Your symptoms do not get better, or they get worse  · You are urinating less, or not at all  · You have questions or concerns about your condition or care  Medicines:   · Medicines  may be given to treat a bacterial, viral, or fungal infection  You may also be given medicines to dilate your bronchial tubes to help you breathe more easily  · Take your medicine as directed  Contact your healthcare provider if you think your medicine is not helping or if you have side effects  Tell him or her if you are allergic to any medicine  Keep a list of the medicines, vitamins, and herbs you take  Include the amounts, and when and why you take them  Bring the list or the pill bottles to follow-up visits  Carry your medicine list with you in case of an emergency  Follow up with your healthcare provider within 3 days or as directed: You may need another x-ray  Write down your questions so you remember to ask them during your visits  Deep breathing and coughing:  Deep breathing helps open the air passages in your lungs  Coughing helps bring up mucus from your lungs  Take a deep breath and hold the breath as long as you can  Then push the air out of your lungs with a deep, strong cough  Spit out any mucus you have coughed up  Take 10 deep breaths in a row every hour that you are awake  Remember to follow each deep breath with a cough     Do not smoke or allow others to smoke around you:  Nicotine and other chemicals in cigarettes and cigars can cause lung damage  Ask your healthcare provider for information if you currently smoke and need help to quit  E-cigarettes or smokeless tobacco still contain nicotine  Talk to your healthcare provider before you use these products  Manage CAP at home:   · Breathe warm, moist air  This helps loosen mucus  Loosely place a warm, wet washcloth over your nose and mouth  A room humidifier may also help make the air moist     · Drink liquids as directed  Ask your healthcare provider how much liquid to drink each day and which liquids to drink  Liquids help make mucus thin and easier to get out of your body  · Gently tap your chest   This helps loosen mucus so it is easier to cough  Lie with your head lower than your chest several times a day and tap your chest      · Get plenty of rest   Rest helps your body heal   Prevent CAP:   · Wash your hands often with soap and water  Carry germ-killing hand gel with you  You can use the gel to clean your hands when soap and water are not available  Do not touch your eyes, nose, or mouth unless you have washed your hands first      · Clean surfaces often  Clean doorknobs, countertops, cell phones, and other surfaces that are touched often  · Always cover your mouth when you cough  Cough into a tissue or your shirtsleeve so you do not spread germs from your hands  · Try to avoid people who have a cold or the flu  If you are sick, stay away from others as much as possible  · Ask about vaccines  You may need a vaccine to help prevent pneumonia  Get an influenza (flu) vaccine every year as soon as it becomes available  © 2017 [address] Information is for End User's use only and may not be sold, redistributed or otherwise used for commercial purposes  All illustrations and images included in CareNotes® are the copyrighted property of [employer]  or [employer]    The above information is an  only  It is not intended as medical advice for individual conditions or treatments  Talk to your doctor, nurse or pharmacist before following any medical regimen to see if it is safe and effective for you

## 2019-01-29 NOTE — PLAN OF CARE
DISCHARGE PLANNING - CARE MANAGEMENT     Discharge to post-acute care or home with appropriate resources Progressing        INFECTION - ADULT     Absence or prevention of progression during hospitalization Progressing        PAIN - ADULT     Verbalizes/displays adequate comfort level or baseline comfort level Progressing        Potential for Falls     Patient will remain free of falls Progressing

## 2019-01-30 NOTE — UTILIZATION REVIEW
Notification of Discharge  This is a Notification of Discharge from our facility [address]  Please be advised that this patient has been discharge from our facility  Below you will find the admission and discharge date and time including the patients disposition  PRESENTATION DATE: 1/26/2019  4:22 PM  IP ADMISSION DATE: 1/26/19 1622  DISCHARGE DATE: 1/29/2019  2:05 PM  DISPOSITION: [address] [facility]"" Utilization Review Department  Phone: [phone] Allison Griffin; Fax [phone]      Send all requests for admission clinical reviews, approved or denied determinations and any other requests to fax [phone]   All voicemails are confidential

## 2019-01-31 LAB
BACTERIA BLD CULT: NORMAL
BACTERIA BLD CULT: NORMAL

## 2019-02-05 NOTE — H&P
INTERNAL MEDICINE HISTORY AND PHYSICAL  [facility] 914-01 SOD Team A    NAME: Tori Gaines  AGE: 26 y o  SEX: female  : 1995   MRN: [id number]  ENCOUNTER: [id number]    DATE: 2019  TIME: 12:40 AM    Primary Care Physician: No primary care provider on file  Admitting Provider: Emilio Hernández MD    Chief complaint: chest pain    History of Present Illness     Manjinder Campbell is a 26 y o  female Manjinder Campbell is a 26 y o  female  with past medical and psychiatric history of asthma, skin cancer status post skin excision, depression, PTSD, intermittent explosive disorder, dissociative identity disorder, currently 19-20 weeks pregnant, who presented to the [facility] emergency department complaining of severe left-sided chest pain and dyspnea  Patient reports 2 days ago she started with pain in her left shoulder that has since progressed to involve her left chest   She reports when she takes a deep breath the pain significantly worsens and feels as though her bones are breaking  She was seen at [facility] 2 days ago and at that time did have a CT scan which showed evidence of pneumonia  And was started on an antibiotic which she does not know  She reports she was then seen at [facility] who did similar workup and started her on prednisone however her pain is significantly worse  She reports feeling short of breath as well  She also states that her whole entire body hurts  She does complain of some lower abdominal pain denies any vaginal bleeding, leakage of vaginal fluid  She reports having fever yesterday but denies fever today  She also reports productive cough for the past several days  On review of systems, she also admits to palpitations    She denies headache, dizziness or near syncope, URI symptoms, hemoptysis, nausea, vomiting, diarrhea, constipation, urinary symptoms, skin rash or color change, extremity swelling or pain, extremity weakness or paresthesia or other focal neurologic deficits  She has received no prenatal care thus far in this pregnancy  Last menstrual period 2018  She is *1 (*had placental abruption and fetal demise at 21 weeks)  She has been feeling the baby move consistently  Review of Systems   Review of Systems   All other systems reviewed and are negative  Past Medical History     Past Medical History:   Diagnosis Date    Anxiety     Asthma     [facility] ([facility] [address] [facility]     skin cancer    Depression     [facility] )     Intermittent explosive disorder in adult     Migraine     PTSD (post-traumatic stress disorder)        Past Surgical History     Past Surgical History:   Procedure Laterality Date    ADENOIDECTOMY      CYST REMOVAL      nares    EAR FOREIGN BODY REMOVAL      SKIN CANCER EXCISION      TONSILLECTOMY         Social History     History   Alcohol Use No     History   Drug Use No     History   Smoking Status    Current Every Day Smoker    Packs/day: 0 50    Types: Cigarettes   Smokeless Tobacco    Never Used       Family History   No family history on file  Medications Prior to Admission     Prior to Admission medications    Medication Sig Start Date End Date Taking?  Authorizing Provider   albuterol (PROVENTIL HFA,VENTOLIN HFA) 90 mcg/act inhaler Inhale 2 puffs every 6 (six) hours as needed for wheezing    Historical Provider, MD   docusate sodium (COLACE) 100 mg capsule Take 1 capsule (100 mg total) by mouth daily as needed for constipation 19   Kiley Cagle MD   doxylamine (UNISON) 25 MG tablet Take 0 5 tablets (12 5 mg total) by mouth 2 (two) times a day as needed for sleep or nausea  Patient not taking: Reported on 2019   Teetee Mckeon MD   famotidine (PEPCID) 20 mg tablet Take 1 tablet (20 mg total) by mouth 2 (two) times a day as needed for heartburn 19   Kiley Cagle MD   ferrous sulfate 325 (65 Fe) mg tablet Take 1 tablet (325 mg total) by mouth 2 (two) times a day before lunch and dinner 1/29/19   Kiley Cagle MD   fluticasone [facility] HFA) 220 mcg/act inhaler Inhale 1 puff every 12 (twelve) hours Rinse mouth after use  1/29/19   [place], MD   miconazole (MONISTAT) 200-2 mg-% (9 g) Apply topically 2 (two) times a day as needed (Irritation) for up to 6 days 1/29/19 2/4/19  Kiley Cagle MD   nicotine (NICODERM CQ) 14 mg/24hr TD 24 hr patch Place 1 patch on the skin daily 1/30/19   Kiley Cagle MD   Prenatal Vit-Fe Fumarate-FA (PRENATAL COMPLETE) 14-0 4 MG TABS Take 1 capsule by mouth daily 1/29/19   Kiley Cagle MD   pyridoxine (B-6) 25 MG tablet Take 1 tablet (25 mg total) by mouth daily  Patient not taking: Reported on 1/26/2019 1/4/19   Teetee Mckeon MD       Allergies     Allergies   Allergen Reactions    Reglan [Metoclopramide] Nausea Only     Patient also passes out    Ketorolac Tromethamine      pain    Prednisone Other (See Comments)     aggression    Abilify [Aripiprazole] Palpitations     Makes patient suicidal       Objective     Vitals:    01/28/19 1500 01/28/19 2202 01/29/19 0717 01/29/19 0900   BP: 117/77 116/77 103/60    Pulse: 99 95 96 90   Resp: 18 18 18    Temp: 97 9 °F (36 6 °C) 98 24 °F (36 8 °C) 99 1 °F (37 3 °C)    TempSrc:       SpO2: 99% 100% 98% 98%   Weight:       Height:         Body mass index is 25 67 kg/m²  No intake or output data in the 24 hours ending 02/05/19 0040  Invasive Devices          No matching active lines, drains, or airways          Physical Exam  GENERAL: Appears well-developed and well-nourished  Appears in no acute distress   HEENT: Normocephalic and atraumatic  No scleral icterus  PERRLA  EOMI B/L  No oropharyngeal edema  MM moist    NECK: Neck supple with no lymphadenopathy  Trachea midline  No JVD  CARDIOVASCULAR: S1 and S2 are present  Regular rate and rhythm  No murmurs, rubs, or gallops  RESPIRATORY: CTA B/L, no rales, rhonci or wheezes  Normal respiratory expansion  ABDOMINAL: Bowel sounds present in all 4 quadrants, non-tender, soft, non-distended  No organomegaly, rebound, or guarding  EXTREMITIES: 2+ DP and PT pulses bilaterally; no cyanosis, clubbing, edema  ROM intact  EWING x4   MUSCULOSKELETAL: No joint tenderness, deformity or swelling, full range of motion without pain  NEUROLOGIC: Patient is alert and oriented to person, place, and time  No sensory or motor deficits  CN 2-12 intact  Plantars downgoing bilaterally  Speech fluent  SKIN: Skin is warm and dry  No skin lesions are present  No rashes  PSYCHIATRIC: Normal mood and affect     Lab Results: I have personally reviewed pertinent reports  CBC:   Results from last 7 days  Lab Units 01/29/19  0605   WBC Thousand/uL 8 42   RBC Million/uL 2 62*   HEMOGLOBIN g/dL 8 1*   HEMATOCRIT % 24 9*   MCV fL 95   MCH pg 30 9   MCHC g/dL 32 5   RDW % 13 8   MPV fL 9 1   PLATELETS Thousands/uL 252   NRBC AUTO /100 WBCs 0   NEUTROS PCT % 73   LYMPHS PCT % 19   MONOS PCT % 6   EOS PCT % 1   BASOS PCT % 0   NEUTROS ABS Thousands/µL 6 18   LYMPHS ABS Thousands/µL 1 57   MONOS ABS Thousand/µL 0 52   EOS ABS Thousand/µL 0 07   , Chemistry Profile:   Results from last 7 days  Lab Units 01/29/19  0605   POTASSIUM mmol/L 3 7   CHLORIDE mmol/L 110*   CO2 mmol/L 21   BUN mg/dL 3*   CREATININE mg/dL 0 49*   CALCIUM mg/dL 7 8*   EGFR ml/min/1 73sq m 139   , Coagulation Studies:       Imaging: I have personally reviewed pertinent films in PACS  Xr Chest 2 Views    Result Date: 1/27/2019  Narrative: CHEST INDICATION:   PNA at outside hospital, severe CP/SOB  Recent history of pneumonia  COMPARISON:  None EXAM PERFORMED/VIEWS:  XR CHEST PA & LATERAL FINDINGS: Cardiomediastinal silhouette appears unremarkable  Airspace consolidation and component of volume loss within both the right middle lobe and anterior left upper lobe  Osseous structures appear within normal limits for patient age       Impression: Bilateral pulmonary consolidation suspicious for pneumonia  Workstation performed: [id number]     Radiology Results    Result Date: 1/29/2019  Narrative: Ordered by an unspecified provider  Urinalysis:       Invalid input(s): URIBILINOGEN     Urine Micro:        EKG, Pathology, and Other Studies: I have personally reviewed pertinent reports  Medications given in Emergency Department       Assessment and Plan      1  Chest pain:  Patient with likely pneumonia based on chest x-ray and vital signs  Lactate normal   Flu negative  · Follow-up sputum culture and Gram stain, strep pneumonia and Legionella urine antigen, procalcitonin, follow-up blood cultures  · Continue IV fluids  · Continue IV ceftriaxone and azithromycin  · Respiratory protocol, aspiration precautions  · Albuterol p r n   · Tramadol p r n  Pain     2  Pregnancy:  Patient is 19-20 weeks pregnant and has not had any establish prenatal care  · Consult OB/GYN     3  Nicotine dependence:  Patient is still actively smoking during the pregnancy and smokes approximately 10 cigarettes a day  · Nicoderm patch  Code Status: Level 1 - Full Code  VTE Pharmacologic Prophylaxis: Enoxaparin (Lovenox)   VTE Mechanical Prophylaxis: sequential compression device  Admission Status: INPATIENT     Admission Time  I spent 30 minutes admitting the patient  This involved direct patient contact where I performed a full history and physical, reviewing previous records, and reviewing laboratory and other diagnostic studies      Claudette Conradi, MD  Internal Medicine  PGY-1

## 2019-03-21 ENCOUNTER — HOSPITAL ENCOUNTER (EMERGENCY)
Facility: HOSPITAL | Age: 24
Discharge: HOME | End: 2019-03-21
Attending: EMERGENCY MEDICINE

## 2019-03-21 VITALS
BODY MASS INDEX: 25.77 KG/M2 | HEART RATE: 108 BPM | TEMPERATURE: 97.8 F | DIASTOLIC BLOOD PRESSURE: 58 MMHG | WEIGHT: 184.08 LBS | HEIGHT: 71 IN | OXYGEN SATURATION: 100 % | SYSTOLIC BLOOD PRESSURE: 131 MMHG | RESPIRATION RATE: 18 BRPM

## 2019-03-21 DIAGNOSIS — J01.00 ACUTE NON-RECURRENT MAXILLARY SINUSITIS: Primary | ICD-10-CM

## 2019-03-21 PROCEDURE — 99283 EMERGENCY DEPT VISIT LOW MDM: CPT

## 2019-03-21 RX ORDER — AMOXICILLIN 500 MG/1
500 CAPSULE ORAL EVERY 8 HOURS SCHEDULED
Qty: 21 CAPSULE | Refills: 0 | Status: SHIPPED | OUTPATIENT
Start: 2019-03-21 | End: 2019-03-28

## 2019-03-21 NOTE — ED PROVIDER NOTES
History  Chief Complaint   Patient presents with    URI     pt with URI like symptoms with body aches      Patient is a 20-year-old female 7 months gestation who presents to the emergency department for evaluation of cough congestion runny nose sinus pressure and pain with subjective fevers and chills for the last 48 hours  Patient does complain significant frontal pressure and pain has had previous sinus infections in the past she does have a history of asthma for which she uses albuterol on a daily basis for symptom control  She otherwise denies aggravating alleviating factors chest pain shortness of breath abdominal pain vaginal discharge bleeding dysuria frequency hematuria  Prior to Admission Medications   Prescriptions Last Dose Informant Patient Reported? Taking? Prenatal Vit-Fe Fumarate-FA (PRENATAL COMPLETE) 14-0 4 MG TABS   No Yes   Sig: Take 1 capsule by mouth daily   albuterol (PROVENTIL HFA,VENTOLIN HFA) 90 mcg/act inhaler   Yes Yes   Sig: Inhale 2 puffs every 6 (six) hours as needed for wheezing   docusate sodium (COLACE) 100 mg capsule   No No   Sig: Take 1 capsule (100 mg total) by mouth daily as needed for constipation   famotidine (PEPCID) 20 mg tablet   No Yes   Sig: Take 1 tablet (20 mg total) by mouth 2 (two) times a day as needed for heartburn   ferrous sulfate 325 (65 Fe) mg tablet   No No   Sig: Take 1 tablet (325 mg total) by mouth 2 (two) times a day before lunch and dinner   fluticasone (FLOVENT HFA) 220 mcg/act inhaler   No No   Sig: Inhale 1 puff every 12 (twelve) hours Rinse mouth after use     nicotine (NICODERM CQ) 14 mg/24hr TD 24 hr patch   No No   Sig: Place 1 patch on the skin daily      Facility-Administered Medications: None       Past Medical History:   Diagnosis Date    Anxiety     Asthma     [facility] [address] )     skin cancer    Depression     [facility]     Intermittent explosive disorder in adult     Migraine     PTSD (post-traumatic stress disorder)        Past Surgical History:   Procedure Laterality Date    ADENOIDECTOMY      CYST REMOVAL      nares    EAR FOREIGN BODY REMOVAL      SKIN CANCER EXCISION      TONSILLECTOMY         History reviewed  No pertinent family history  I have reviewed and agree with the history as documented  Social History     Tobacco Use    Smoking status: Current Every Day Smoker     Packs/day: 0 50     Types: Cigarettes    Smokeless tobacco: Never Used   Substance Use Topics    Alcohol use: No    Drug use: No        Review of Systems   Constitutional: Negative for chills, diaphoresis, fatigue and fever  HENT: Positive for congestion, rhinorrhea, sinus pressure, sinus pain, sneezing and sore throat  Negative for ear pain  Respiratory: Negative for cough, shortness of breath, wheezing and stridor  Cardiovascular: Negative for chest pain, palpitations and leg swelling  Gastrointestinal: Negative for abdominal distention, abdominal pain, blood in stool, constipation, diarrhea, nausea and vomiting  Genitourinary: Negative for difficulty urinating, dysuria, frequency, hematuria and urgency  Musculoskeletal: Negative for gait problem, myalgias and neck pain  Skin: Negative for rash  Neurological: Negative for dizziness, syncope, weakness, light-headedness and headaches  All other systems reviewed and are negative  Physical Exam  Physical Exam   Constitutional: She is oriented to person, place, and time  She appears well-developed and well-nourished  No distress  HENT:   Head: Normocephalic and atraumatic  Right Ear: External ear normal    Left Ear: External ear normal    Nose: Rhinorrhea present  Right sinus exhibits maxillary sinus tenderness  Left sinus exhibits maxillary sinus tenderness  Mouth/Throat: Oropharynx is clear and moist    Eyes: Pupils are equal, round, and reactive to light  Conjunctivae and EOM are normal  Right eye exhibits no discharge   Left eye exhibits no discharge  Neck: Normal range of motion  Neck supple  No thyromegaly present  Cardiovascular: Normal rate, regular rhythm and normal heart sounds  Exam reveals no gallop and no friction rub  No murmur heard  Pulmonary/Chest: Effort normal and breath sounds normal  No stridor  No respiratory distress  She has no wheezes  She has no rales  She exhibits no tenderness  Abdominal: Soft  Bowel sounds are normal    Musculoskeletal: She exhibits no edema, tenderness or deformity  Lymphadenopathy:     She has no cervical adenopathy  Neurological: She is alert and oriented to person, place, and time  Skin: Skin is warm and dry  No rash noted  She is not diaphoretic  No erythema  No pallor  Psychiatric: She has a normal mood and affect  Her behavior is normal    Nursing note and vitals reviewed        Vital Signs  ED Triage Vitals [03/21/19 1523]   Temperature Pulse Respirations Blood Pressure SpO2   97 8 °F (36 6 °C) (!) 108 18 131/58 100 %      Temp Source Heart Rate Source Patient Position - Orthostatic VS BP Location FiO2 (%)   Oral Monitor Sitting Right arm --      Pain Score       5           Vitals:    03/21/19 1523   BP: 131/58   Pulse: (!) 108   Patient Position - Orthostatic VS: Sitting         Visual Acuity      ED Medications  Medications - No data to display    Diagnostic Studies  Results Reviewed     None                 No orders to display              Procedures  Procedures       Phone Contacts  ED Phone Contact    ED Course                               MDM    Disposition  Final diagnoses:   Acute non-recurrent maxillary sinusitis     Time reflects when diagnosis was documented in both MDM as applicable and the Disposition within this note     Time User Action Codes Description Comment    3/21/2019  3:54 PM Elena Lundy Add [J01 00] Acute non-recurrent maxillary sinusitis       ED Disposition     ED Disposition Condition Date/Time Comment    Discharge Stable Thu Mar 21, 2019  3:54 PM Tori ROCIO Gaines discharge to home/self care              Follow-up Information     Follow up With Specialties Details Why Contact Info Additional Information    [id number] [facility] Emergency Department Emergency Medicine In 1 week As needed [address] [facility] [address], [place], [address]          Discharge Medication List as of 3/21/2019  3:55 PM      START taking these medications    Details   amoxicillin (AMOXIL) 500 mg capsule Take 1 capsule (500 mg total) by mouth every 8 (eight) hours for 7 days, Starting Thu 3/21/2019, Until Thu 3/28/2019, Print         CONTINUE these medications which have NOT CHANGED    Details   albuterol (PROVENTIL HFA,VENTOLIN HFA) 90 mcg/act inhaler Inhale 2 puffs every 6 (six) hours as needed for wheezing, Historical Med      famotidine (PEPCID) 20 mg tablet Take 1 tablet (20 mg total) by mouth 2 (two) times a day as needed for heartburn, Starting Tue 1/29/2019, Print      Prenatal Vit-Fe Fumarate-FA (PRENATAL COMPLETE) 14-0 4 MG TABS Take 1 capsule by mouth daily, Starting Tue 1/29/2019, Print      docusate sodium (COLACE) 100 mg capsule Take 1 capsule (100 mg total) by mouth daily as needed for constipation, Starting Tue 1/29/2019, Print      ferrous sulfate 325 (65 Fe) mg tablet Take 1 tablet (325 mg total) by mouth 2 (two) times a day before lunch and dinner, Starting Tue 1/29/2019, Print      fluticasone (FLOVENT HFA) 220 mcg/act inhaler Inhale 1 puff every 12 (twelve) hours Rinse mouth after use , Starting Tue 1/29/2019, Print      nicotine (NICODERM CQ) 14 mg/24hr TD 24 hr patch Place 1 patch on the skin daily, Starting Wed 1/30/2019, Print      doxylamine (UNISON) 25 MG tablet Take 0 5 tablets (12 5 mg total) by mouth 2 (two) times a day as needed for sleep or nausea, Starting Fri 1/4/2019, Print      pyridoxine (B-6) 25 MG tablet Take 1 tablet (25 mg total) by mouth daily, Starting Fri 1/4/2019, Print           No discharge procedures on file      ED Provider  Electronically Signed by           Serena Spears PA-C  03/21/19 0337

## 2019-04-15 ENCOUNTER — HOSPITAL ENCOUNTER (EMERGENCY)
Facility: HOSPITAL | Age: 24
Discharge: HOME | End: 2019-04-15
Admitting: EMERGENCY MEDICINE
Payer: COMMERCIAL

## 2019-04-15 VITALS
TEMPERATURE: 97.7 F | HEART RATE: 79 BPM | OXYGEN SATURATION: 100 % | WEIGHT: 184.08 LBS | DIASTOLIC BLOOD PRESSURE: 62 MMHG | SYSTOLIC BLOOD PRESSURE: 100 MMHG | RESPIRATION RATE: 18 BRPM | HEIGHT: 71 IN | BODY MASS INDEX: 25.77 KG/M2

## 2019-04-15 DIAGNOSIS — R11.2 NAUSEA & VOMITING: Primary | ICD-10-CM

## 2019-04-15 LAB
ALBUMIN SERPL BCP-MCNC: 2.7 G/DL (ref 3.5–5)
ALP SERPL-CCNC: 90 U/L (ref 46–116)
ALT SERPL W P-5'-P-CCNC: 18 U/L (ref 12–78)
ANION GAP SERPL CALCULATED.3IONS-SCNC: 8 MMOL/L (ref 4–13)
AST SERPL W P-5'-P-CCNC: 33 U/L (ref 5–45)
BACTERIA UR QL AUTO: NORMAL /HPF
BASOPHILS # BLD AUTO: 0.02 THOUSANDS/ΜL (ref 0–0.1)
BASOPHILS NFR BLD AUTO: 0 % (ref 0–1)
BILIRUB SERPL-MCNC: 0.2 MG/DL (ref 0.2–1)
BILIRUB UR QL STRIP: NEGATIVE
BUN SERPL-MCNC: 5 MG/DL (ref 5–25)
CALCIUM SERPL-MCNC: 8.5 MG/DL (ref 8.3–10.1)
CHLORIDE SERPL-SCNC: 103 MMOL/L (ref 100–108)
CLARITY UR: CLEAR
CO2 SERPL-SCNC: 24 MMOL/L (ref 21–32)
COLOR UR: YELLOW
CREAT SERPL-MCNC: 0.68 MG/DL (ref 0.6–1.3)
EOSINOPHIL # BLD AUTO: 0.24 THOUSAND/ΜL (ref 0–0.61)
EOSINOPHIL NFR BLD AUTO: 3 % (ref 0–6)
ERYTHROCYTE [DISTWIDTH] IN BLOOD BY AUTOMATED COUNT: 13.6 % (ref 11.6–15.1)
GFR SERPL CREATININE-BSD FRML MDRD: 125 ML/MIN/1.73SQ M
GLUCOSE SERPL-MCNC: 72 MG/DL (ref 65–140)
GLUCOSE UR STRIP-MCNC: NEGATIVE MG/DL
HCT VFR BLD AUTO: 36.3 % (ref 34.8–46.1)
HGB BLD-MCNC: 11.9 G/DL (ref 11.5–15.4)
HGB UR QL STRIP.AUTO: NEGATIVE
IMM GRANULOCYTES # BLD AUTO: 0.06 THOUSAND/UL (ref 0–0.2)
IMM GRANULOCYTES NFR BLD AUTO: 1 % (ref 0–2)
KETONES UR STRIP-MCNC: NEGATIVE MG/DL
LEUKOCYTE ESTERASE UR QL STRIP: ABNORMAL
LYMPHOCYTES # BLD AUTO: 2.19 THOUSANDS/ΜL (ref 0.6–4.47)
LYMPHOCYTES NFR BLD AUTO: 31 % (ref 14–44)
MCH RBC QN AUTO: 30.3 PG (ref 26.8–34.3)
MCHC RBC AUTO-ENTMCNC: 32.8 G/DL (ref 31.4–37.4)
MCV RBC AUTO: 92 FL (ref 82–98)
MONOCYTES # BLD AUTO: 0.8 THOUSAND/ΜL (ref 0.17–1.22)
MONOCYTES NFR BLD AUTO: 11 % (ref 4–12)
NEUTROPHILS # BLD AUTO: 3.68 THOUSANDS/ΜL (ref 1.85–7.62)
NEUTS SEG NFR BLD AUTO: 54 % (ref 43–75)
NITRITE UR QL STRIP: NEGATIVE
NON-SQ EPI CELLS URNS QL MICRO: NORMAL /HPF
NRBC BLD AUTO-RTO: 0 /100 WBCS
PH UR STRIP.AUTO: 6.5 [PH]
PLATELET # BLD AUTO: 193 THOUSANDS/UL (ref 149–390)
PMV BLD AUTO: 9.6 FL (ref 8.9–12.7)
POTASSIUM SERPL-SCNC: 4.1 MMOL/L (ref 3.5–5.3)
PROT SERPL-MCNC: 6.7 G/DL (ref 6.4–8.2)
PROT UR STRIP-MCNC: NEGATIVE MG/DL
RBC # BLD AUTO: 3.93 MILLION/UL (ref 3.81–5.12)
RBC #/AREA URNS AUTO: NORMAL /HPF
SODIUM SERPL-SCNC: 135 MMOL/L (ref 136–145)
SP GR UR STRIP.AUTO: <=1.005 (ref 1–1.03)
UROBILINOGEN UR QL STRIP.AUTO: 0.2 E.U./DL
WBC # BLD AUTO: 6.99 THOUSAND/UL (ref 4.31–10.16)
WBC #/AREA URNS AUTO: NORMAL /HPF

## 2019-04-15 PROCEDURE — 87086 URINE CULTURE/COLONY COUNT: CPT | Performed by: NURSE PRACTITIONER

## 2019-04-15 PROCEDURE — 96361 HYDRATE IV INFUSION ADD-ON: CPT

## 2019-04-15 PROCEDURE — 85025 COMPLETE CBC W/AUTO DIFF WBC: CPT | Performed by: NURSE PRACTITIONER

## 2019-04-15 PROCEDURE — 81001 URINALYSIS AUTO W/SCOPE: CPT | Performed by: NURSE PRACTITIONER

## 2019-04-15 PROCEDURE — 99283 EMERGENCY DEPT VISIT LOW MDM: CPT

## 2019-04-15 PROCEDURE — 36415 COLL VENOUS BLD VENIPUNCTURE: CPT | Performed by: NURSE PRACTITIONER

## 2019-04-15 PROCEDURE — 99283 EMERGENCY DEPT VISIT LOW MDM: CPT | Performed by: NURSE PRACTITIONER

## 2019-04-15 PROCEDURE — 96374 THER/PROPH/DIAG INJ IV PUSH: CPT

## 2019-04-15 PROCEDURE — 80053 COMPREHEN METABOLIC PANEL: CPT | Performed by: NURSE PRACTITIONER

## 2019-04-15 RX ORDER — ONDANSETRON 4 MG/1
4 TABLET, ORALLY DISINTEGRATING ORAL EVERY 8 HOURS PRN
Qty: 12 TABLET | Refills: 0 | Status: SHIPPED | OUTPATIENT
Start: 2019-04-15

## 2019-04-15 RX ORDER — ONDANSETRON 2 MG/ML
4 INJECTION INTRAMUSCULAR; INTRAVENOUS ONCE
Status: COMPLETED | OUTPATIENT
Start: 2019-04-15 | End: 2019-04-15

## 2019-04-15 RX ADMIN — SODIUM CHLORIDE 1000 ML: 0.9 INJECTION, SOLUTION INTRAVENOUS at 11:09

## 2019-04-15 RX ADMIN — ONDANSETRON 4 MG: 2 INJECTION INTRAMUSCULAR; INTRAVENOUS at 11:09

## 2019-04-16 LAB — BACTERIA UR CULT: NORMAL

## 2019-05-17 ENCOUNTER — HOSPITAL ENCOUNTER (EMERGENCY)
Facility: HOSPITAL | Age: 24
Discharge: HOME | End: 2019-05-17
Attending: EMERGENCY MEDICINE | Admitting: EMERGENCY MEDICINE
Payer: COMMERCIAL

## 2019-05-17 VITALS
TEMPERATURE: 98.4 F | HEART RATE: 92 BPM | SYSTOLIC BLOOD PRESSURE: 124 MMHG | DIASTOLIC BLOOD PRESSURE: 60 MMHG | RESPIRATION RATE: 22 BRPM | OXYGEN SATURATION: 98 %

## 2019-05-17 DIAGNOSIS — N76.0 BV (BACTERIAL VAGINOSIS): ICD-10-CM

## 2019-05-17 DIAGNOSIS — B37.3 VULVOVAGINAL CANDIDIASIS: ICD-10-CM

## 2019-05-17 DIAGNOSIS — N39.0 UTI (URINARY TRACT INFECTION): Primary | ICD-10-CM

## 2019-05-17 DIAGNOSIS — B96.89 BV (BACTERIAL VAGINOSIS): ICD-10-CM

## 2019-05-17 LAB
BACTERIA UR QL AUTO: ABNORMAL /HPF
BILIRUB UR QL STRIP: NEGATIVE
CLARITY UR: CLEAR
COLOR UR: YELLOW
GLUCOSE UR STRIP-MCNC: NEGATIVE MG/DL
HGB UR QL STRIP.AUTO: NEGATIVE
KETONES UR STRIP-MCNC: NEGATIVE MG/DL
LEUKOCYTE ESTERASE UR QL STRIP: ABNORMAL
NITRITE UR QL STRIP: NEGATIVE
NON-SQ EPI CELLS URNS QL MICRO: ABNORMAL /HPF
PH UR STRIP.AUTO: 8 [PH]
PROT UR STRIP-MCNC: NEGATIVE MG/DL
RBC #/AREA URNS AUTO: ABNORMAL /HPF
SP GR UR STRIP.AUTO: 1.02 (ref 1–1.03)
UROBILINOGEN UR QL STRIP.AUTO: 0.2 E.U./DL
WBC #/AREA URNS AUTO: ABNORMAL /HPF

## 2019-05-17 PROCEDURE — 87510 GARDNER VAG DNA DIR PROBE: CPT | Performed by: EMERGENCY MEDICINE

## 2019-05-17 PROCEDURE — 81001 URINALYSIS AUTO W/SCOPE: CPT | Performed by: EMERGENCY MEDICINE

## 2019-05-17 PROCEDURE — 87086 URINE CULTURE/COLONY COUNT: CPT | Performed by: EMERGENCY MEDICINE

## 2019-05-17 PROCEDURE — 87480 CANDIDA DNA DIR PROBE: CPT | Performed by: EMERGENCY MEDICINE

## 2019-05-17 PROCEDURE — 87529 HSV DNA AMP PROBE: CPT | Performed by: EMERGENCY MEDICINE

## 2019-05-17 PROCEDURE — 87147 CULTURE TYPE IMMUNOLOGIC: CPT | Performed by: EMERGENCY MEDICINE

## 2019-05-17 PROCEDURE — 99283 EMERGENCY DEPT VISIT LOW MDM: CPT | Performed by: EMERGENCY MEDICINE

## 2019-05-17 PROCEDURE — 99283 EMERGENCY DEPT VISIT LOW MDM: CPT

## 2019-05-17 PROCEDURE — 87660 TRICHOMONAS VAGIN DIR PROBE: CPT | Performed by: EMERGENCY MEDICINE

## 2019-05-17 RX ORDER — OMEPRAZOLE 20 MG/1
20 CAPSULE, DELAYED RELEASE ORAL DAILY
COMMUNITY

## 2019-05-17 RX ORDER — CALCIUM CARBONATE 300MG(750)
TABLET,CHEWABLE ORAL
COMMUNITY

## 2019-05-17 RX ORDER — METRONIDAZOLE 500 MG/1
500 TABLET ORAL EVERY 12 HOURS SCHEDULED
Qty: 14 TABLET | Refills: 0 | Status: SHIPPED | OUTPATIENT
Start: 2019-05-17 | End: 2019-05-24

## 2019-05-17 RX ORDER — ASPIRIN 81 MG/1
81 TABLET ORAL DAILY
COMMUNITY

## 2019-05-17 RX ORDER — METRONIDAZOLE 500 MG/1
500 TABLET ORAL ONCE
Status: COMPLETED | OUTPATIENT
Start: 2019-05-17 | End: 2019-05-17

## 2019-05-17 RX ORDER — CEPHALEXIN 500 MG/1
500 CAPSULE ORAL EVERY 12 HOURS SCHEDULED
Qty: 14 CAPSULE | Refills: 0 | Status: SHIPPED | OUTPATIENT
Start: 2019-05-17 | End: 2019-05-24

## 2019-05-17 RX ORDER — CEPHALEXIN 250 MG/1
500 CAPSULE ORAL ONCE
Status: COMPLETED | OUTPATIENT
Start: 2019-05-17 | End: 2019-05-17

## 2019-05-17 RX ADMIN — METRONIDAZOLE 500 MG: 500 TABLET, FILM COATED ORAL at 17:52

## 2019-05-17 RX ADMIN — MICONAZOLE NITRATE: 2 CREAM TOPICAL at 17:39

## 2019-05-17 RX ADMIN — CEPHALEXIN 500 MG: 250 CAPSULE ORAL at 18:48

## 2019-05-18 LAB
BACTERIA UR CULT: ABNORMAL
BACTERIA UR CULT: ABNORMAL

## 2019-05-19 LAB
CANDIDA RRNA VAG QL PROBE: POSITIVE
G VAGINALIS RRNA GENITAL QL PROBE: NEGATIVE
T VAGINALIS RRNA GENITAL QL PROBE: NEGATIVE

## 2019-05-20 LAB
HSV1 DNA SPEC QL NAA+PROBE: NEGATIVE
HSV2 DNA SPEC QL NAA+PROBE: NEGATIVE

## 2019-06-07 ENCOUNTER — HOSPITAL ENCOUNTER (EMERGENCY)
Facility: HOSPITAL | Age: 24
Discharge: HOME | End: 2019-06-07
Attending: EMERGENCY MEDICINE | Admitting: EMERGENCY MEDICINE
Payer: COMMERCIAL

## 2019-06-07 ENCOUNTER — APPOINTMENT (EMERGENCY)
Dept: CT IMAGING | Facility: HOSPITAL | Age: 24
End: 2019-06-07
Payer: COMMERCIAL

## 2019-06-07 VITALS
HEIGHT: 70 IN | SYSTOLIC BLOOD PRESSURE: 129 MMHG | DIASTOLIC BLOOD PRESSURE: 77 MMHG | RESPIRATION RATE: 19 BRPM | BODY MASS INDEX: 26.57 KG/M2 | WEIGHT: 185.63 LBS | HEART RATE: 97 BPM | OXYGEN SATURATION: 98 % | TEMPERATURE: 97.9 F

## 2019-06-07 DIAGNOSIS — J32.9 SINUSITIS: Primary | ICD-10-CM

## 2019-06-07 PROCEDURE — 99284 EMERGENCY DEPT VISIT MOD MDM: CPT

## 2019-06-07 PROCEDURE — 99283 EMERGENCY DEPT VISIT LOW MDM: CPT | Performed by: EMERGENCY MEDICINE

## 2019-06-07 RX ORDER — ONDANSETRON 2 MG/ML
4 INJECTION INTRAMUSCULAR; INTRAVENOUS ONCE
Status: DISCONTINUED | OUTPATIENT
Start: 2019-06-07 | End: 2019-06-07

## 2019-06-07 RX ORDER — OLANZAPINE 10 MG/1
10 INJECTION, POWDER, LYOPHILIZED, FOR SOLUTION INTRAMUSCULAR ONCE
Status: DISCONTINUED | OUTPATIENT
Start: 2019-06-07 | End: 2019-06-07

## 2019-06-07 RX ORDER — DIPHENHYDRAMINE HYDROCHLORIDE 50 MG/ML
50 INJECTION INTRAMUSCULAR; INTRAVENOUS ONCE
Status: DISCONTINUED | OUTPATIENT
Start: 2019-06-07 | End: 2019-06-07

## 2019-09-21 ENCOUNTER — HOSPITAL ENCOUNTER (EMERGENCY)
Facility: HOSPITAL | Age: 24
Discharge: HOME | End: 2019-09-22
Attending: EMERGENCY MEDICINE
Payer: COMMERCIAL

## 2019-09-21 DIAGNOSIS — R51.9 ACUTE NONINTRACTABLE HEADACHE, UNSPECIFIED HEADACHE TYPE: Primary | ICD-10-CM

## 2019-09-21 PROCEDURE — 99283 EMERGENCY DEPT VISIT LOW MDM: CPT

## 2019-09-21 PROCEDURE — 99284 EMERGENCY DEPT VISIT MOD MDM: CPT | Performed by: EMERGENCY MEDICINE

## 2019-09-21 RX ORDER — BUTALBITAL, ACETAMINOPHEN AND CAFFEINE 50; 325; 40 MG/1; MG/1; MG/1
1 TABLET ORAL ONCE
Status: COMPLETED | OUTPATIENT
Start: 2019-09-22 | End: 2019-09-22

## 2019-09-21 RX ORDER — ONDANSETRON 2 MG/ML
4 INJECTION INTRAMUSCULAR; INTRAVENOUS ONCE
Status: COMPLETED | OUTPATIENT
Start: 2019-09-22 | End: 2019-09-22

## 2019-09-21 RX ORDER — DIPHENHYDRAMINE HYDROCHLORIDE 50 MG/ML
50 INJECTION INTRAMUSCULAR; INTRAVENOUS ONCE
Status: COMPLETED | OUTPATIENT
Start: 2019-09-22 | End: 2019-09-22

## 2019-09-21 RX ORDER — MAGNESIUM SULFATE HEPTAHYDRATE 40 MG/ML
2 INJECTION, SOLUTION INTRAVENOUS ONCE
Status: COMPLETED | OUTPATIENT
Start: 2019-09-22 | End: 2019-09-22

## 2019-09-22 VITALS
SYSTOLIC BLOOD PRESSURE: 129 MMHG | TEMPERATURE: 98.6 F | DIASTOLIC BLOOD PRESSURE: 74 MMHG | OXYGEN SATURATION: 97 % | BODY MASS INDEX: 26.63 KG/M2 | WEIGHT: 185.63 LBS | RESPIRATION RATE: 16 BRPM | HEART RATE: 68 BPM

## 2019-09-22 PROCEDURE — 96375 TX/PRO/DX INJ NEW DRUG ADDON: CPT

## 2019-09-22 PROCEDURE — 96366 THER/PROPH/DIAG IV INF ADDON: CPT

## 2019-09-22 PROCEDURE — 96365 THER/PROPH/DIAG IV INF INIT: CPT

## 2019-09-22 RX ORDER — BUTALBITAL, ACETAMINOPHEN AND CAFFEINE 50; 325; 40 MG/1; MG/1; MG/1
1 TABLET ORAL EVERY 4 HOURS PRN
Qty: 12 TABLET | Refills: 0 | Status: SHIPPED | OUTPATIENT
Start: 2019-09-22

## 2019-09-22 RX ADMIN — ONDANSETRON 4 MG: 2 INJECTION INTRAMUSCULAR; INTRAVENOUS at 00:12

## 2019-09-22 RX ADMIN — BUTALBITAL, ACETAMINOPHEN, AND CAFFEINE 1 TABLET: 50; 325; 40 TABLET ORAL at 00:15

## 2019-09-22 RX ADMIN — MAGNESIUM SULFATE HEPTAHYDRATE 2 G: 40 INJECTION, SOLUTION INTRAVENOUS at 00:12

## 2019-09-22 RX ADMIN — DIPHENHYDRAMINE HYDROCHLORIDE 50 MG: 50 INJECTION, SOLUTION INTRAMUSCULAR; INTRAVENOUS at 00:12

## 2019-09-22 NOTE — ED PROVIDER NOTES
History  Chief Complaint   Patient presents with    Headache - Recurrent or Known Dx Migraines     pt co of R sided headache that started 2 days ago, +nausea, light sensitivity        History provided by:  Patient  Headache - Recurrent or Known Dx Migraines   Pain location:  R temporal  Quality:  Sharp and dull  Radiates to:  Does not radiate  Severity currently:  7/10  Severity at highest:  10/10  Onset quality:  Gradual  Duration:  2 days  Timing:  Constant  Progression:  Worsening  Chronicity:  Recurrent (Patient states she has had migraines off and on since age 16  She is to be on fiorecet in the past  Feels like a typical migraine )  Similar to prior headaches: yes    Context: activity and bright light    Context comment:  States her migraines start out as regular HA and then develop true migraine with throbbing on 1 side  Relieved by:  Nothing  Worsened by:  Nothing  Ineffective treatments:  Acetaminophen  Associated symptoms: nausea and photophobia    Associated symptoms: no abdominal pain, no blurred vision, no cough, no diarrhea, no fatigue, no fever, no neck pain, no neck stiffness, no numbness, no sore throat and no vomiting        Prior to Admission Medications   Prescriptions Last Dose Informant Patient Reported? Taking? Magnesium 400 MG TABS Not Taking at Unknown time  Yes No   Sig: Take by mouth   Prenatal Vit-Fe Fumarate-FA (PRENATAL COMPLETE) 14-0 4 MG TABS Not Taking at Unknown time  No No   Sig: Take 1 capsule by mouth daily   Patient not taking: Reported on 9/21/2019   albuterol (PROVENTIL HFA,VENTOLIN HFA) 90 mcg/act inhaler 9/21/2019 at Unknown time  Yes Yes   Sig: Inhale 2 puffs every 6 (six) hours as needed for wheezing   aspirin (ECOTRIN LOW STRENGTH) 81 mg EC tablet Not Taking at Unknown time  Yes No   Sig: Take 81 mg by mouth daily   fluticasone (FLOVENT HFA) 220 mcg/act inhaler Not Taking at Unknown time  No No   Sig: Inhale 1 puff every 12 (twelve) hours Rinse mouth after use  Patient not taking: Reported on 9/21/2019   omeprazole (PriLOSEC) 20 mg delayed release capsule 9/21/2019 at Unknown time  Yes Yes   Sig: Take 20 mg by mouth daily   ondansetron (ZOFRAN-ODT) 4 mg disintegrating tablet 9/21/2019 at Unknown time  No Yes   Sig: Take 1 tablet (4 mg total) by mouth every 8 (eight) hours as needed for nausea or vomiting for up to 12 doses      Facility-Administered Medications: None       Past Medical History:   Diagnosis Date    Anxiety     Asthma     Cancer ([facility] )     skin cancer    Depression     Dissociative identity disorder ([facility] )     Intermittent explosive disorder in adult     Migraine     PTSD (post-traumatic stress disorder)        Past Surgical History:   Procedure Laterality Date    ADENOIDECTOMY      CYST REMOVAL      nares    EAR FOREIGN BODY REMOVAL      SKIN CANCER EXCISION      TONSILLECTOMY         History reviewed  No pertinent family history  I have reviewed and agree with the history as documented  Social History     Tobacco Use    Smoking status: Current Every Day Smoker     Packs/day: 0 50     Types: Cigarettes    Smokeless tobacco: Never Used   Substance Use Topics    Alcohol use: No    Drug use: No        Review of Systems   Constitutional: Negative for fatigue and fever  HENT: Negative for sore throat  Eyes: Positive for photophobia  Negative for blurred vision  Respiratory: Negative for cough  Gastrointestinal: Positive for nausea  Negative for abdominal pain, diarrhea and vomiting  Musculoskeletal: Negative for neck pain and neck stiffness  Neurological: Negative for numbness  All other systems reviewed and are negative  Physical Exam  Physical Exam   Constitutional: She is oriented to person, place, and time  She appears well-developed and well-nourished  HENT:   Head: Normocephalic and atraumatic  Eyes: Pupils are equal, round, and reactive to light  EOM are normal    Neck: Neck supple     Cardiovascular: Normal rate and regular rhythm  Pulmonary/Chest: Effort normal and breath sounds normal    Abdominal: Soft  Bowel sounds are normal  She exhibits no distension  There is no tenderness  Musculoskeletal: Normal range of motion  Neurological: She is alert and oriented to person, place, and time  No cranial nerve deficit  She exhibits normal muscle tone  Skin: Skin is warm  Psychiatric: She has a normal mood and affect  Vitals reviewed        Vital Signs  ED Triage Vitals   Temperature Pulse Respirations Blood Pressure SpO2   09/21/19 2258 09/21/19 2300 09/21/19 2258 09/21/19 2258 09/21/19 2300   98 6 °F (37 °C) 92 16 124/80 97 %      Temp Source Heart Rate Source Patient Position - Orthostatic VS BP Location FiO2 (%)   09/21/19 2258 09/21/19 2258 09/21/19 2258 09/21/19 2258 --   Oral Monitor Lying Right arm       Pain Score       09/21/19 2258       7           Vitals:    09/21/19 2258 09/21/19 2300 09/21/19 2330   BP: 124/80  121/60   Pulse:  92 67   Patient Position - Orthostatic VS: Lying  Sitting         Visual Acuity      ED Medications  Medications   magnesium sulfate 2 g/50 mL IVPB (premix) 2 g (2 g Intravenous New Bag 9/22/19 0012)   ondansetron (ZOFRAN) injection 4 mg (4 mg Intravenous Given 9/22/19 0012)   diphenhydrAMINE (BENADRYL) injection 50 mg (50 mg Intravenous Given 9/22/19 0012)   butalbital-acetaminophen-caffeine (FIORICET,ESGIC) -40 mg per tablet 1 tablet (1 tablet Oral Given 9/22/19 0015)       Diagnostic Studies  Results Reviewed     None                 No orders to display              Procedures  Procedures       ED Course                               MDM  Number of Diagnoses or Management Options  Acute nonintractable headache, unspecified headache type: new and does not require workup      Disposition  Final diagnoses:   Acute nonintractable headache, unspecified headache type     Time reflects when diagnosis was documented in both MDM as applicable and the Disposition within this note     Time User Action Codes Description Comment    9/22/2019 12:35 AM [facility], Dianne Martin Add [R51] Acute nonintractable headache, unspecified headache type       ED Disposition     ED Disposition Condition Date/Time Comment    Discharge Stable Sun Sep 22, 2019 12:35 AM Tez Gaines discharge to home/self care  Follow-up Information     Follow up With Specialties Details Why Contact Info Additional [facility] Emergency Department Emergency Medicine Go to  If symptoms worsen [address] [place] [id number]  [address] [place] [address] ED, [address] [place] [address], [facility]          Patient's Medications   Discharge Prescriptions    BUTALBITAL-ACETAMINOPHEN-CAFFEINE (FIORICET,ESGIC) -40 MG PER TABLET    Take 1 tablet by mouth every 4 (four) hours as needed for headaches       Start Date: 9/22/2019 End Date: --       Order Dose: 1 tablet       Quantity: 12 tablet    Refills: 0     No discharge procedures on file      ED Provider  Electronically Signed by           Ximena Fernandez MD  09/22/19 7042

## 2019-10-20 ENCOUNTER — APPOINTMENT (EMERGENCY)
Dept: RADIOLOGY | Facility: HOSPITAL | Age: 24
End: 2019-10-20
Payer: COMMERCIAL

## 2019-10-20 ENCOUNTER — HOSPITAL ENCOUNTER (EMERGENCY)
Facility: HOSPITAL | Age: 24
Discharge: HOME | End: 2019-10-20
Attending: EMERGENCY MEDICINE
Payer: COMMERCIAL

## 2019-10-20 VITALS
HEART RATE: 78 BPM | HEIGHT: 70 IN | TEMPERATURE: 98.3 F | OXYGEN SATURATION: 99 % | RESPIRATION RATE: 20 BRPM | WEIGHT: 191.8 LBS | SYSTOLIC BLOOD PRESSURE: 121 MMHG | BODY MASS INDEX: 27.46 KG/M2 | DIASTOLIC BLOOD PRESSURE: 61 MMHG

## 2019-10-20 DIAGNOSIS — S90.30XA CONTUSION OF FOOT: Primary | ICD-10-CM

## 2019-10-20 PROCEDURE — 99283 EMERGENCY DEPT VISIT LOW MDM: CPT

## 2019-10-20 PROCEDURE — 73630 X-RAY EXAM OF FOOT: CPT

## 2019-10-20 PROCEDURE — 99282 EMERGENCY DEPT VISIT SF MDM: CPT | Performed by: EMERGENCY MEDICINE

## 2019-10-23 NOTE — ED PROVIDER NOTES
History  Chief Complaint   Patient presents with    Foot Injury     pt c/o right foot pain due to injuring it  26year old female patient presents emergency department for evaluation of right foot pain  The patient has no acute abnormality on physical exam   X-ray was reviewed interpreted by me and there is no acute abnormality  Patient will be discharged with follow-up as needed  History provided by:  Patient   used: No    Foot Injury - Major   Location:  Foot  Injury: no    Foot location:  R foot  Pain details:     Quality:  Aching    Radiates to:  Does not radiate    Severity:  Mild    Onset quality:  Gradual    Timing:  Constant  Associated symptoms: no back pain, no decreased ROM, no muscle weakness, no neck pain, no numbness and no swelling    Risk factors: no frequent fractures, no known bone disorder and no recent illness        Prior to Admission Medications   Prescriptions Last Dose Informant Patient Reported? Taking? Magnesium 400 MG TABS   Yes No   Sig: Take by mouth   Prenatal Vit-Fe Fumarate-FA (PRENATAL COMPLETE) 14-0 4 MG TABS   No No   Sig: Take 1 capsule by mouth daily   Patient not taking: Reported on 9/21/2019   albuterol (PROVENTIL HFA,VENTOLIN HFA) 90 mcg/act inhaler   Yes No   Sig: Inhale 2 puffs every 6 (six) hours as needed for wheezing   aspirin (ECOTRIN LOW STRENGTH) 81 mg EC tablet   Yes No   Sig: Take 81 mg by mouth daily   butalbital-acetaminophen-caffeine (FIORICET,ESGIC) -40 mg per tablet   No No   Sig: Take 1 tablet by mouth every 4 (four) hours as needed for headaches   fluticasone (FLOVENT HFA) 220 mcg/act inhaler   No No   Sig: Inhale 1 puff every 12 (twelve) hours Rinse mouth after use     Patient not taking: Reported on 9/21/2019   omeprazole (PriLOSEC) 20 mg delayed release capsule   Yes No   Sig: Take 20 mg by mouth daily   ondansetron (ZOFRAN-ODT) 4 mg disintegrating tablet   No No   Sig: Take 1 tablet (4 mg total) by mouth every 8 (eight) hours as needed for nausea or vomiting for up to 12 doses      Facility-Administered Medications: None       Past Medical History:   Diagnosis Date    Anxiety     Asthma     Cancer ([facility] )     skin cancer    Depression     Dissociative identity disorder ([facility] )     Intermittent explosive disorder in adult     Migraine     PTSD (post-traumatic stress disorder)        Past Surgical History:   Procedure Laterality Date    ADENOIDECTOMY      CYST REMOVAL      nares    EAR FOREIGN BODY REMOVAL      SKIN CANCER EXCISION      TONSILLECTOMY         History reviewed  No pertinent family history  I have reviewed and agree with the history as documented  Social History     Tobacco Use    Smoking status: Current Every Day Smoker     Packs/day: 0 50     Types: Cigarettes    Smokeless tobacco: Never Used   Substance Use Topics    Alcohol use: No    Drug use: No        Review of Systems   Musculoskeletal: Negative for back pain and neck pain  All other systems reviewed and are negative  Physical Exam  Physical Exam   Constitutional: She is oriented to person, place, and time  She appears well-developed and well-nourished  HENT:   Head: Normocephalic and atraumatic  Right Ear: External ear normal    Left Ear: External ear normal    Eyes: Conjunctivae and EOM are normal    Neck: No JVD present  No tracheal deviation present  No thyromegaly present  Cardiovascular: Normal rate  Pulmonary/Chest: Effort normal and breath sounds normal  No stridor  Abdominal: Soft  She exhibits no distension and no mass  There is no tenderness  There is no guarding  No hernia  Musculoskeletal: Normal range of motion  She exhibits no edema, tenderness or deformity  Lymphadenopathy:     She has no cervical adenopathy  Neurological: She is alert and oriented to person, place, and time  Skin: Skin is warm  No rash noted  No erythema  No pallor  Psychiatric: She has a normal mood and affect   Her behavior is normal    Nursing note and vitals reviewed  Vital Signs  ED Triage Vitals [10/20/19 1057]   Temperature Pulse Respirations Blood Pressure SpO2   98 3 °F (36 8 °C) 78 20 121/61 99 %      Temp Source Heart Rate Source Patient Position - Orthostatic VS BP Location FiO2 (%)   Oral Monitor Sitting Right arm --      Pain Score       5           Vitals:    10/20/19 1057   BP: 121/61   Pulse: 78   Patient Position - Orthostatic VS: Sitting         Visual Acuity      ED Medications  Medications - No data to display    Diagnostic Studies  Results Reviewed     None                 XR foot 3+ views RIGHT   ED Interpretation by Mary Gutierrez DO (10/20 1130)   No acute abnormality      Final Result by Allyn Downs MD (10/21 4337)      No acute osseous abnormality  Workstation performed: [id number]                    Procedures  Procedures       ED Course                               MDM  Number of Diagnoses or Management Options  Contusion of foot: new and requires workup     Amount and/or Complexity of Data Reviewed  Tests in the radiology section of CPT®: ordered and reviewed  Decide to obtain previous medical records or to obtain history from someone other than the patient: yes  Review and summarize past medical records: yes    Patient Progress  Patient progress: stable      Disposition  Final diagnoses:   Contusion of foot     Time reflects when diagnosis was documented in both MDM as applicable and the Disposition within this note     Time User Action Codes Description Comment    10/20/2019 11:31 AM Mayuri Charles Add [S90 30XA] Contusion of foot       ED Disposition     ED Disposition Condition Date/Time Comment    Discharge Stable Sun Oct 20, 2019 11:31 AM John Gaines discharge to home/self care              Follow-up Information     Follow up With Specialties Details Why [address] Lucina Angulo DPM Podiatry   [address] [facility]  [phone]            Discharge Medication List as of 10/20/2019 11:31 AM      CONTINUE these medications which have NOT CHANGED    Details   albuterol (PROVENTIL HFA,VENTOLIN HFA) 90 mcg/act inhaler Inhale 2 puffs every 6 (six) hours as needed for wheezing, Historical Med      aspirin (ECOTRIN LOW STRENGTH) 81 mg EC tablet Take 81 mg by mouth daily, Historical Med      butalbital-acetaminophen-caffeine (FIORICET,ESGIC) -40 mg per tablet Take 1 tablet by mouth every 4 (four) hours as needed for headaches, Starting Sun 9/22/2019, Print      fluticasone (FLOVENT HFA) 220 mcg/act inhaler Inhale 1 puff every 12 (twelve) hours Rinse mouth after use , Starting Tue 1/29/2019, Print      Magnesium 400 MG TABS Take by mouth, Historical Med      omeprazole (PriLOSEC) 20 mg delayed release capsule Take 20 mg by mouth daily, Historical Med      ondansetron (ZOFRAN-ODT) 4 mg disintegrating tablet Take 1 tablet (4 mg total) by mouth every 8 (eight) hours as needed for nausea or vomiting for up to 12 doses, Starting Mon 4/15/2019, Print      Prenatal Vit-Fe Fumarate-FA (PRENATAL COMPLETE) 14-0 4 MG TABS Take 1 capsule by mouth daily, Starting Tue 1/29/2019, Print           No discharge procedures on file      ED Provider  Electronically Signed by           Mary Gutierrez DO  10/23/19 9844

## 2021-01-15 NOTE — EMTALA/ACUTE CARE TRANSFER
[address] [address]  Dept: [phone]      EMTALA TRANSFER CONSENT    NAME Alejandro Gomes                                         1995                              MRN [id number]    I have been informed of my rights regarding examination, treatment, and transfer   by Dr Carina Rodriguez DO    Benefits: Specialized equipment and/or services available at the receiving facility (Include comment)________________________ (Ob/Gyn service)    Risks: Potential for delay in receiving treatment, Potential deterioration of medical condition, Loss of IV, Increased discomfort during transfer, Possible worsening of condition or death during transfer      Consent for Transfer:  I acknowledge that my medical condition has been evaluated and explained to me by the emergency department physician or other qualified medical person and/or my attending physician, who has recommended that I be transferred to the service of  Accepting Physician: Dr Oliver Points at [address] [facility] Name, [address] : [facility]  The above potential benefits of such transfer, the potential risks associated with such transfer, and the probable risks of not being transferred have been explained to me, and I fully understand them  The doctor has explained that, in my case, the benefits of transfer outweigh the risks  I agree to be transferred  I authorize the performance of emergency medical procedures and treatments upon me in both transit and upon arrival at the receiving facility  Additionally, I authorize the release of any and all medical records to the receiving facility and request they be transported with me, if possible  I understand that the safest mode of transportation during a medical emergency is an ambulance and that the Hospital advocates the use of this mode of transport   Risks of traveling to the receiving facility by car, including absence of medical Detail Level: Zone control, life sustaining equipment, such as oxygen, and medical personnel has been explained to me and I fully understand them  (LOUISA CORRECT BOX BELOW)  [  ]  I consent to the stated transfer and to be transported by ambulance/helicopter  [  ]  I consent to the stated transfer, but refuse transportation by ambulance and accept full responsibility for my transportation by car  I understand the risks of non-ambulance transfers and I exonerate the Hospital and its staff from any deterioration in my condition that results from this refusal     X___________________________________________    DATE  19  TIME________  Signature of patient or legally responsible individual signing on patient behalf           RELATIONSHIP TO PATIENT_________________________          Provider Certification    NAME Curt Rader                                         1995                              MRN [id number]    A medical screening exam was performed on the above named patient  Based on the examination:    Condition Necessitating Transfer The primary encounter diagnosis was Bilateral pneumonia  Diagnoses of Left sided chest pain and Dyspnea were also pertinent to this visit      Patient Condition: The patient has been stabilized such that within reasonable medical probability, no material deterioration of the patient condition or the condition of the unborn child(wojciech) is likely to result from the transfer    Reason for Transfer: Level of Care needed not available at this facility    Transfer Requirements: Facility [facility] [place]   · Space available and qualified personnel available for treatment as acknowledged by    · Agreed to accept transfer and to provide appropriate medical treatment as acknowledged by       Dr Celine Gilbert  · Appropriate medical records of the examination and treatment of the patient are provided at the time of transfer   [address], [address] _______  · Transfer will be performed by qualified personnel from    and appropriate transfer equipment as required, including the use of necessary and appropriate life support measures  Provider Certification: I have examined the patient and explained the following risks and benefits of being transferred/refusing transfer to the patient/family:  General risk, such as traffic hazards, adverse weather conditions, rough terrain or turbulence, possible failure of equipment (including vehicle or aircraft), or consequences of actions of persons outside the control of the transport personnel      Based on these reasonable risks and benefits to the patient and/or the unborn child(wojciech), and based upon the information available at the time of the patients examination, I certify that the medical benefits reasonably to be expected from the provision of appropriate medical treatments at another medical facility outweigh the increasing risks, if any, to the individuals medical condition, and in the case of labor to the unborn child, from effecting the transfer      X____________________________________________ DATE 01/26/19        TIME_______      ORIGINAL - SEND TO MEDICAL RECORDS   COPY - SEND WITH PATIENT DURING TRANSFER

## 2025-06-25 NOTE — NURSING NOTE
Pt c/o new onset sharp lower abdominal pain and headache  Recently given tylenol  SOD-[facility] resident aware  Told to page OB regarding abd pain  Detail Level: Generalized General Sunscreen Counseling: I recommended a broad spectrum sunscreen with a SPF of 30 or higher.  I explained that SPF 30 sunscreens block approximately 97 percent of the sun's harmful rays.  Sunscreens should be applied at least 15 minutes prior to expected sun exposure and then every 2 hours after that as long as sun exposure continues. If swimming or exercising sunscreen should be reapplied every 45 minutes to an hour after getting wet or sweating.  One ounce, or the equivalent of a shot glass full of sunscreen, is adequate to protect the skin not covered by a bathing suit. I also recommended a lip balm with a sunscreen as well. Sun protective clothing can be used in lieu of sunscreen but must be worn the entire time you are exposed to the sun's rays.